# Patient Record
Sex: FEMALE | Race: WHITE | NOT HISPANIC OR LATINO | Employment: OTHER | URBAN - METROPOLITAN AREA
[De-identification: names, ages, dates, MRNs, and addresses within clinical notes are randomized per-mention and may not be internally consistent; named-entity substitution may affect disease eponyms.]

---

## 2017-05-10 ENCOUNTER — OFFICE VISIT (OUTPATIENT)
Dept: OBSTETRICS AND GYNECOLOGY | Facility: CLINIC | Age: 62
End: 2017-05-10
Payer: COMMERCIAL

## 2017-05-10 VITALS — HEIGHT: 60 IN | DIASTOLIC BLOOD PRESSURE: 72 MMHG | SYSTOLIC BLOOD PRESSURE: 130 MMHG

## 2017-05-10 DIAGNOSIS — N60.21 LUMPY BREASTS, RIGHT: Primary | ICD-10-CM

## 2017-05-10 DIAGNOSIS — N64.4 PAIN OF RIGHT BREAST: ICD-10-CM

## 2017-05-10 PROCEDURE — 99213 OFFICE O/P EST LOW 20 MIN: CPT | Mod: S$GLB,,, | Performed by: OBSTETRICS & GYNECOLOGY

## 2017-05-10 PROCEDURE — 1160F RVW MEDS BY RX/DR IN RCRD: CPT | Mod: S$GLB,,, | Performed by: OBSTETRICS & GYNECOLOGY

## 2017-05-10 PROCEDURE — 99999 PR PBB SHADOW E&M-NEW PATIENT-LVL II: CPT | Mod: PBBFAC,,, | Performed by: OBSTETRICS & GYNECOLOGY

## 2017-05-10 RX ORDER — OXYBUTYNIN CHLORIDE 10 MG/1
10 TABLET, EXTENDED RELEASE ORAL
COMMUNITY
Start: 2015-05-20

## 2017-05-10 RX ORDER — HYDROCHLOROTHIAZIDE 25 MG/1
25 TABLET ORAL
Status: ON HOLD | COMMUNITY
Start: 2015-05-20 | End: 2018-07-26 | Stop reason: HOSPADM

## 2017-05-10 RX ORDER — METOPROLOL SUCCINATE 50 MG/1
50 TABLET, EXTENDED RELEASE ORAL
Status: ON HOLD | COMMUNITY
Start: 2015-05-20 | End: 2018-07-23

## 2017-05-10 RX ORDER — ENALAPRIL MALEATE 5 MG/1
5 TABLET ORAL
Status: ON HOLD | COMMUNITY
Start: 2015-05-20 | End: 2018-07-26 | Stop reason: HOSPADM

## 2017-05-10 RX ORDER — TOBRAMYCIN 3 MG/ML
1 SOLUTION/ DROPS OPHTHALMIC
Status: ON HOLD | COMMUNITY
End: 2018-07-26 | Stop reason: HOSPADM

## 2017-05-10 RX ORDER — WARFARIN SODIUM 5 MG/1
5 TABLET ORAL
COMMUNITY
Start: 2015-05-20

## 2017-05-10 RX ORDER — CLOTRIMAZOLE 10 MG/1
10 LOZENGE ORAL; TOPICAL
COMMUNITY
Start: 2015-05-20

## 2017-05-10 RX ORDER — INSULIN GLARGINE 100 [IU]/ML
INJECTION, SOLUTION SUBCUTANEOUS
COMMUNITY
Start: 2015-08-21

## 2017-05-10 RX ORDER — NYSTATIN AND TRIAMCINOLONE ACETONIDE 100000; 1 [USP'U]/G; MG/G
CREAM TOPICAL
Status: ON HOLD | COMMUNITY
Start: 2015-05-20 | End: 2018-07-26 | Stop reason: HOSPADM

## 2017-05-10 RX ORDER — AMLODIPINE BESYLATE 5 MG/1
5 TABLET ORAL
COMMUNITY
Start: 2015-05-25

## 2017-05-10 RX ORDER — METFORMIN HYDROCHLORIDE 1000 MG/1
1000 TABLET ORAL
Status: ON HOLD | COMMUNITY
Start: 2015-05-20 | End: 2018-07-26 | Stop reason: HOSPADM

## 2017-05-10 NOTE — MR AVS SNAPSHOT
[facility]  [address] [facility]  [place] [address]  Phone: [phone]  Fax: [phone]                  Daiana Bear   5/10/2017 11:15 AM   Office Visit    Description:  Female : 1955   Provider:  Jenny Tomlinson MD   Department:  [facility]           Reason for Visit     Breast Mass           Diagnoses this Visit        Comments    Lumpy breasts, left    -  Primary            To Do List           Future Appointments        Provider Department Dept Phone    2017 2:30 PM [facility] [id number] [facility] [phone]      Goals (5 Years of Data)     None      [facility]     [facility] Nurse Care Line -  Assistance  Unless otherwise directed by your provider, please contact [facility], our nurse care line that is available for  assistance.     Registered nurses in the [facility] provide: appointment scheduling, clinical advisement, health education, and other advisory services.  Call: [phone] (toll free)               Medications                Verify that the below list of medications is an accurate representation of the medications you are currently taking.  If none reported, the list may be blank. If incorrect, please contact your healthcare provider. Carry this list with you in case of emergency.           Current Medications     amlodipine (NORVASC) 5 MG tablet Take 5 mg by mouth.    clotrimazole (MYCELEX) 10 mg sruthi Take 10 mg by mouth.    enalapril (VASOTEC) 5 MG tablet Take 5 mg by mouth.    hydrochlorothiazide (HYDRODIURIL) 25 MG tablet Take 25 mg by mouth.    insulin glargine (LANTUS SOLOSTAR) 100 unit/mL (3 mL) InPn pen INJECT 58 UNITS INTO SKIN EVERY EVENING    metformin (GLUCOPHAGE) 1000 MG tablet Take 1,000 mg by mouth.    metoprolol succinate (TOPROL-XL) 50 MG 24 hr tablet Take 50 mg by mouth.    nystatin-triamcinolone (MYCOLOG II) cream Apply topically.    oxybutynin (DITROPAN-XL) 10 MG 24 hr tablet  Take 10 mg by mouth.    tobramycin sulfate 0.3% (TOBREX) 0.3 % ophthalmic solution 1 drop.    warfarin (COUMADIN) 5 MG tablet Take 5 mg by mouth.           Clinical Reference Information           Your Vitals Were     BP Height Last Period             130/72 5' (1.524 m) (Approximate)         Blood Pressure          Most Recent Value    BP  130/72      Allergies as of 5/10/2017     Ciprofloxacin    Fluconazole    Prednisone    Rivaroxaban      Immunizations Administered on Date of Encounter - 5/10/2017     None      Orders Placed During Today's Visit     Future Labs/Procedures Expected by Expires    Mammo Digital Diagnostic Bilat with CAD  5/10/2017 7/10/2018      [facility] Sign-Up     Activating your [facility] account is as easy as 1-2-3!     1) Visit [email or web address], select Sign Up Now, enter this activation code and your date of birth, then select Next.  [email or web address]  Expires: 6/24/2017 12:24 PM      2) Create a username and password to use when you visit [facility] in the future and select a security question in case you lose your password and select Next.    3) Enter your e-mail address and click Sign Up!    Additional Information  If you have questions, please e-mail [email or web address] or call [phone] to talk to our [facility] staff. Remember, [facility] is NOT to be used for urgent needs. For medical emergencies, dial 911.         Language Assistance Services     ATTENTION: Language assistance services are available, free of charge. Please call [phone].      ATENCIÓN: Si habla español, tiene a de souza disposición servicios gratuitos de asistencia lingüística. Llame al [phone].     CHÚ Ý: N?u b?n nói Ti?ng Vi?t, có các d?ch v? h? tr? ngôn ng? mi?n phí dành cho b?n. G?i s? [phone].         [facility] complies with applicable Federal civil rights laws and does not discriminate on the basis of race, color, national origin, age, disability, or sex.

## 2017-05-10 NOTE — PROGRESS NOTES
"Subjective:       Patient ID: Daiana Bear is a 61 y.o. female.    Chief Complaint:  Breast Mass      History of Present Illness  HPI  here for problem   C/o breast lump/pain  Noticed when starting doing upper body strengthening in preparation for lower leg prosthesis  Feels "lump" is smaller and not noticing as much pain  Feels had mammo at least in , thinks had done in 2016--but not sure    GYN & OB History  No LMP recorded (approximate). Patient is postmenopausal.   Date of Last Pap: No result found    OB History    Para Term  AB SAB TAB Ectopic Multiple Living   1    1 1          # Outcome Date GA Lbr Joe/2nd Weight Sex Delivery Anes PTL Lv   1 SAB                   Review of Systems  Review of Systems   Constitutional: Negative for activity change, appetite change, chills, diaphoresis, fatigue, fever and unexpected weight change.   HENT: Negative for mouth sores and tinnitus.    Eyes: Negative for discharge and visual disturbance.   Respiratory: Negative for cough, shortness of breath and wheezing.    Cardiovascular: Negative for chest pain, palpitations and leg swelling.   Gastrointestinal: Negative for abdominal pain, bloating, blood in stool, constipation, diarrhea, nausea and vomiting.   Endocrine: Negative for diabetes, hair loss, hot flashes, hyperthyroidism and hypothyroidism.   Genitourinary: Negative for decreased libido, dyspareunia, dysuria, flank pain, frequency, genital sores, hematuria, menorrhagia, menstrual problem, pelvic pain, urgency, vaginal bleeding, vaginal discharge, vaginal pain, dysmenorrhea, urinary incontinence, postcoital bleeding, postmenopausal bleeding and vaginal odor.   Musculoskeletal: Negative for back pain and myalgias.   Skin:  Negative for rash, no acne and hair changes.   Neurological: Negative for seizures, syncope, numbness and headaches.   Hematological: Negative for adenopathy. Does not bruise/bleed easily.   Psychiatric/Behavioral: Negative for " depression and sleep disturbance. The patient is not nervous/anxious.    Breast: Positive for breast mass and breast pain.Negative for nipple discharge and skin changes          Objective:    Physical Exam:        Pulmonary/Chest: Right breast exhibits tenderness. Right breast exhibits no mass, no skin change, presence and no swelling. Left breast exhibits no mass, no nipple discharge, no skin change, no tenderness, presence and no swelling. Breasts are symmetrical.                                   Assessment:         Encounter Diagnoses   Name Primary?    Lumpy breasts, right Yes    Pain of right breast                Plan:   Will get dx mammo  Suspect fibrocystic breast  F/u based on dx mammo findings

## 2017-05-16 ENCOUNTER — HOSPITAL ENCOUNTER (OUTPATIENT)
Dept: RADIOLOGY | Facility: HOSPITAL | Age: 62
Discharge: HOME | End: 2017-05-16
Attending: OBSTETRICS & GYNECOLOGY
Payer: COMMERCIAL

## 2017-05-16 DIAGNOSIS — N60.21 LUMPY BREASTS, RIGHT: ICD-10-CM

## 2017-05-16 PROCEDURE — 77062 BREAST TOMOSYNTHESIS BI: CPT | Mod: TC

## 2017-05-16 PROCEDURE — 76642 ULTRASOUND BREAST LIMITED: CPT | Mod: 26,RT,, | Performed by: RADIOLOGY

## 2017-05-16 PROCEDURE — 77066 DX MAMMO INCL CAD BI: CPT | Mod: 26,,, | Performed by: RADIOLOGY

## 2017-05-16 PROCEDURE — 76642 ULTRASOUND BREAST LIMITED: CPT | Mod: TC,PO,RT

## 2017-05-16 PROCEDURE — 77062 BREAST TOMOSYNTHESIS BI: CPT | Mod: 26,,, | Performed by: RADIOLOGY

## 2017-05-23 ENCOUNTER — TELEPHONE (OUTPATIENT)
Dept: RADIOLOGY | Facility: HOSPITAL | Age: 62
End: 2017-05-23

## 2017-05-23 NOTE — TELEPHONE ENCOUNTER
Breast Care Management Follow-Up:    Date of Mammogram:05/16/17    Mammogram Reason:Lump or thickening in the right breast    Mammogram Results:and Right U/S - no abnormality seen.      Referrals/Recommendations:Annual mammo. Any decision to biopsy should be made on a clinical basis.        Patient Status:05/23/17 Results and rec given to pt. Results letter and report mailed to pt. Pt denies feeling any lump at this time. Encouraged her to contact Dr. Tomlinson with any changes. She agreed.

## 2018-07-22 ENCOUNTER — HOSPITAL ENCOUNTER (OUTPATIENT)
Facility: HOSPITAL | Age: 63
Discharge: NURSING, REHAB OR LONG-TERM CARE | End: 2018-07-26
Attending: INTERNAL MEDICINE | Admitting: HOSPITALIST
Payer: MEDICARE

## 2018-07-22 DIAGNOSIS — B99.9 INFECTION: ICD-10-CM

## 2018-07-22 DIAGNOSIS — N39.0 UTI (URINARY TRACT INFECTION): ICD-10-CM

## 2018-07-22 DIAGNOSIS — R73.9 HYPERGLYCEMIA: ICD-10-CM

## 2018-07-22 DIAGNOSIS — R62.7 FTT (FAILURE TO THRIVE) IN ADULT: ICD-10-CM

## 2018-07-22 DIAGNOSIS — R79.89 ELEVATED TROPONIN: ICD-10-CM

## 2018-07-22 DIAGNOSIS — I10 ESSENTIAL HYPERTENSION: Primary | ICD-10-CM

## 2018-07-22 DIAGNOSIS — E83.59 CALCIPHYLAXIS: ICD-10-CM

## 2018-07-22 DIAGNOSIS — R07.9 CHEST PAIN: ICD-10-CM

## 2018-07-22 DIAGNOSIS — M79.3 ACUTE PANNICULITIS: ICD-10-CM

## 2018-07-22 PROBLEM — R53.81 DEBILITY: Status: ACTIVE | Noted: 2018-07-22

## 2018-07-22 PROBLEM — M54.50 CHRONIC LOWER BACK PAIN: Status: ACTIVE | Noted: 2018-07-22

## 2018-07-22 PROBLEM — I25.10 CAD IN NATIVE ARTERY: Status: ACTIVE | Noted: 2018-07-22

## 2018-07-22 PROBLEM — Z86.718 HISTORY OF DVT (DEEP VEIN THROMBOSIS): Status: ACTIVE | Noted: 2018-07-22

## 2018-07-22 PROBLEM — I25.10 CAD IN NATIVE ARTERY: Chronic | Status: ACTIVE | Noted: 2018-07-22

## 2018-07-22 PROBLEM — I25.2 HISTORY OF MI (MYOCARDIAL INFARCTION): Status: ACTIVE | Noted: 2018-07-22

## 2018-07-22 PROBLEM — K76.0 FATTY LIVER DISEASE, NONALCOHOLIC: Status: ACTIVE | Noted: 2018-07-22

## 2018-07-22 PROBLEM — N32.81 OVERACTIVE BLADDER: Status: ACTIVE | Noted: 2018-07-22

## 2018-07-22 PROBLEM — G89.29 CHRONIC LOWER BACK PAIN: Status: ACTIVE | Noted: 2018-07-22

## 2018-07-22 PROBLEM — H91.90 HEARING LOSS: Status: ACTIVE | Noted: 2018-07-22

## 2018-07-22 PROBLEM — S22.080A COMPRESSION FRACTURE OF T12 VERTEBRA: Status: ACTIVE | Noted: 2018-07-22

## 2018-07-22 PROBLEM — M17.0 OSTEOARTHRITIS OF BOTH KNEES: Status: ACTIVE | Noted: 2018-07-22

## 2018-07-22 PROBLEM — K57.30 SIGMOID DIVERTICULOSIS: Status: ACTIVE | Noted: 2018-07-22

## 2018-07-22 PROBLEM — Z86.718 HISTORY OF DVT (DEEP VEIN THROMBOSIS): Chronic | Status: ACTIVE | Noted: 2018-07-22

## 2018-07-22 PROBLEM — E78.5 HYPERLIPIDEMIA: Status: ACTIVE | Noted: 2018-07-22

## 2018-07-22 LAB
ALBUMIN SERPL BCP-MCNC: 3.3 G/DL
ALP SERPL-CCNC: 87 U/L
ALT SERPL W/O P-5'-P-CCNC: 22 U/L
ANION GAP SERPL CALC-SCNC: 11 MMOL/L
AST SERPL-CCNC: 19 U/L
BACTERIA #/AREA URNS HPF: ABNORMAL /HPF
BASOPHILS # BLD AUTO: 0.03 K/UL
BASOPHILS NFR BLD: 0.3 %
BILIRUB SERPL-MCNC: 0.4 MG/DL
BILIRUB UR QL STRIP: NEGATIVE
BNP SERPL-MCNC: 331 PG/ML
BUN SERPL-MCNC: 12 MG/DL
CALCIUM SERPL-MCNC: 9.3 MG/DL
CHLORIDE SERPL-SCNC: 100 MMOL/L
CK MB SERPL-MCNC: 1.3 NG/ML
CK MB SERPL-RTO: 1.5 %
CK SERPL-CCNC: 85 U/L
CLARITY UR: ABNORMAL
CO2 SERPL-SCNC: 26 MMOL/L
COLOR UR: YELLOW
CREAT SERPL-MCNC: 1.1 MG/DL
DIFFERENTIAL METHOD: NORMAL
EOSINOPHIL # BLD AUTO: 0.2 K/UL
EOSINOPHIL NFR BLD: 1.9 %
ERYTHROCYTE [DISTWIDTH] IN BLOOD BY AUTOMATED COUNT: 13.6 %
EST. GFR  (AFRICAN AMERICAN): >60 ML/MIN/1.73 M^2
EST. GFR  (NON AFRICAN AMERICAN): 53 ML/MIN/1.73 M^2
GLUCOSE SERPL-MCNC: 329 MG/DL
GLUCOSE UR QL STRIP: ABNORMAL
HCT VFR BLD AUTO: 42.2 %
HGB BLD-MCNC: 13.9 G/DL
HGB UR QL STRIP: ABNORMAL
HYALINE CASTS #/AREA URNS LPF: 0 /LPF
INR PPP: 2
KETONES UR QL STRIP: ABNORMAL
LACTATE SERPL-SCNC: 1.3 MMOL/L
LACTATE SERPL-SCNC: 2 MMOL/L
LEUKOCYTE ESTERASE UR QL STRIP: ABNORMAL
LYMPHOCYTES # BLD AUTO: 2.5 K/UL
LYMPHOCYTES NFR BLD: 25.8 %
MAGNESIUM SERPL-MCNC: 1.8 MG/DL
MCH RBC QN AUTO: 28.1 PG
MCHC RBC AUTO-ENTMCNC: 32.9 G/DL
MCV RBC AUTO: 85 FL
MICROSCOPIC COMMENT: ABNORMAL
MONOCYTES # BLD AUTO: 0.6 K/UL
MONOCYTES NFR BLD: 5.8 %
NEUTROPHILS # BLD AUTO: 6.3 K/UL
NEUTROPHILS NFR BLD: 66.2 %
NITRITE UR QL STRIP: POSITIVE
PH UR STRIP: 6 [PH] (ref 5–8)
PLATELET # BLD AUTO: 230 K/UL
PMV BLD AUTO: 11 FL
POCT GLUCOSE: 296 MG/DL (ref 70–110)
POCT GLUCOSE: 299 MG/DL (ref 70–110)
POTASSIUM SERPL-SCNC: 4.2 MMOL/L
PROCALCITONIN SERPL IA-MCNC: 0.19 NG/ML
PROT SERPL-MCNC: 7.1 G/DL
PROT UR QL STRIP: ABNORMAL
PROTHROMBIN TIME: 20.6 SEC
PTH-INTACT SERPL-MCNC: 71.6 PG/ML
RBC # BLD AUTO: 4.94 M/UL
RBC #/AREA URNS HPF: 20 /HPF (ref 0–4)
SODIUM SERPL-SCNC: 137 MMOL/L
SP GR UR STRIP: 1.02 (ref 1–1.03)
TROPONIN I SERPL DL<=0.01 NG/ML-MCNC: 0.05 NG/ML
TSH SERPL DL<=0.005 MIU/L-ACNC: 1.79 UIU/ML
URN SPEC COLLECT METH UR: ABNORMAL
UROBILINOGEN UR STRIP-ACNC: NEGATIVE EU/DL
WBC # BLD AUTO: 9.51 K/UL
WBC #/AREA URNS HPF: >100 /HPF (ref 0–5)
YEAST URNS QL MICRO: ABNORMAL

## 2018-07-22 PROCEDURE — 83970 ASSAY OF PARATHORMONE: CPT

## 2018-07-22 PROCEDURE — 87186 SC STD MICRODIL/AGAR DIL: CPT

## 2018-07-22 PROCEDURE — 93010 ELECTROCARDIOGRAM REPORT: CPT | Mod: ,,, | Performed by: INTERNAL MEDICINE

## 2018-07-22 PROCEDURE — 84443 ASSAY THYROID STIM HORMONE: CPT

## 2018-07-22 PROCEDURE — 84484 ASSAY OF TROPONIN QUANT: CPT

## 2018-07-22 PROCEDURE — G0378 HOSPITAL OBSERVATION PER HR: HCPCS

## 2018-07-22 PROCEDURE — 87088 URINE BACTERIA CULTURE: CPT

## 2018-07-22 PROCEDURE — 82553 CREATINE MB FRACTION: CPT

## 2018-07-22 PROCEDURE — 25000003 PHARM REV CODE 250: Performed by: INTERNAL MEDICINE

## 2018-07-22 PROCEDURE — 85025 COMPLETE CBC W/AUTO DIFF WBC: CPT

## 2018-07-22 PROCEDURE — 63600175 PHARM REV CODE 636 W HCPCS: Performed by: INTERNAL MEDICINE

## 2018-07-22 PROCEDURE — 87086 URINE CULTURE/COLONY COUNT: CPT

## 2018-07-22 PROCEDURE — 36415 COLL VENOUS BLD VENIPUNCTURE: CPT

## 2018-07-22 PROCEDURE — 84145 PROCALCITONIN (PCT): CPT

## 2018-07-22 PROCEDURE — 80053 COMPREHEN METABOLIC PANEL: CPT

## 2018-07-22 PROCEDURE — 87077 CULTURE AEROBIC IDENTIFY: CPT

## 2018-07-22 PROCEDURE — 83605 ASSAY OF LACTIC ACID: CPT

## 2018-07-22 PROCEDURE — 86160 COMPLEMENT ANTIGEN: CPT

## 2018-07-22 PROCEDURE — 83735 ASSAY OF MAGNESIUM: CPT

## 2018-07-22 PROCEDURE — 96372 THER/PROPH/DIAG INJ SC/IM: CPT

## 2018-07-22 PROCEDURE — 63600175 PHARM REV CODE 636 W HCPCS: Performed by: NURSE PRACTITIONER

## 2018-07-22 PROCEDURE — 86160 COMPLEMENT ANTIGEN: CPT | Mod: 59

## 2018-07-22 PROCEDURE — 83880 ASSAY OF NATRIURETIC PEPTIDE: CPT

## 2018-07-22 PROCEDURE — 99285 EMERGENCY DEPT VISIT HI MDM: CPT | Mod: 25

## 2018-07-22 PROCEDURE — 36569 INSJ PICC 5 YR+ W/O IMAGING: CPT

## 2018-07-22 PROCEDURE — 82962 GLUCOSE BLOOD TEST: CPT

## 2018-07-22 PROCEDURE — 36000 PLACE NEEDLE IN VEIN: CPT

## 2018-07-22 PROCEDURE — 21400001 HC TELEMETRY ROOM

## 2018-07-22 PROCEDURE — C1751 CATH, INF, PER/CENT/MIDLINE: HCPCS

## 2018-07-22 PROCEDURE — 87040 BLOOD CULTURE FOR BACTERIA: CPT | Mod: 59

## 2018-07-22 PROCEDURE — 85610 PROTHROMBIN TIME: CPT

## 2018-07-22 PROCEDURE — 83605 ASSAY OF LACTIC ACID: CPT | Mod: 91

## 2018-07-22 PROCEDURE — 82550 ASSAY OF CK (CPK): CPT

## 2018-07-22 PROCEDURE — 11000001 HC ACUTE MED/SURG PRIVATE ROOM

## 2018-07-22 PROCEDURE — 25000003 PHARM REV CODE 250: Performed by: NURSE PRACTITIONER

## 2018-07-22 PROCEDURE — 81000 URINALYSIS NONAUTO W/SCOPE: CPT

## 2018-07-22 RX ORDER — SODIUM CHLORIDE, SODIUM LACTATE, POTASSIUM CHLORIDE, CALCIUM CHLORIDE 600; 310; 30; 20 MG/100ML; MG/100ML; MG/100ML; MG/100ML
INJECTION, SOLUTION INTRAVENOUS CONTINUOUS
Status: DISCONTINUED | OUTPATIENT
Start: 2018-07-22 | End: 2018-07-23

## 2018-07-22 RX ORDER — ASPIRIN 81 MG/1
81 TABLET ORAL DAILY
Status: DISCONTINUED | OUTPATIENT
Start: 2018-07-23 | End: 2018-07-26 | Stop reason: HOSPADM

## 2018-07-22 RX ORDER — ASPIRIN 325 MG
325 TABLET, DELAYED RELEASE (ENTERIC COATED) ORAL
Status: COMPLETED | OUTPATIENT
Start: 2018-07-22 | End: 2018-07-22

## 2018-07-22 RX ORDER — INSULIN ASPART 100 [IU]/ML
0-5 INJECTION, SOLUTION INTRAVENOUS; SUBCUTANEOUS
Status: DISCONTINUED | OUTPATIENT
Start: 2018-07-22 | End: 2018-07-26 | Stop reason: HOSPADM

## 2018-07-22 RX ORDER — ONDANSETRON 2 MG/ML
4 INJECTION INTRAMUSCULAR; INTRAVENOUS EVERY 6 HOURS PRN
Status: DISCONTINUED | OUTPATIENT
Start: 2018-07-22 | End: 2018-07-26 | Stop reason: HOSPADM

## 2018-07-22 RX ORDER — OXYBUTYNIN CHLORIDE 5 MG/1
10 TABLET, EXTENDED RELEASE ORAL DAILY
Status: DISCONTINUED | OUTPATIENT
Start: 2018-07-23 | End: 2018-07-26 | Stop reason: HOSPADM

## 2018-07-22 RX ORDER — PANTOPRAZOLE SODIUM 40 MG/1
40 TABLET, DELAYED RELEASE ORAL DAILY
Status: DISCONTINUED | OUTPATIENT
Start: 2018-07-23 | End: 2018-07-26 | Stop reason: HOSPADM

## 2018-07-22 RX ORDER — METOPROLOL SUCCINATE 50 MG/1
50 TABLET, EXTENDED RELEASE ORAL DAILY
Status: DISCONTINUED | OUTPATIENT
Start: 2018-07-23 | End: 2018-07-23

## 2018-07-22 RX ORDER — ACETAMINOPHEN 325 MG/1
650 TABLET ORAL EVERY 6 HOURS PRN
Status: DISCONTINUED | OUTPATIENT
Start: 2018-07-22 | End: 2018-07-26 | Stop reason: HOSPADM

## 2018-07-22 RX ORDER — VANCOMYCIN HCL IN 5 % DEXTROSE 1G/250ML
15 PLASTIC BAG, INJECTION (ML) INTRAVENOUS ONCE
Status: COMPLETED | OUTPATIENT
Start: 2018-07-22 | End: 2018-07-22

## 2018-07-22 RX ORDER — VANCOMYCIN HCL IN 5 % DEXTROSE 1G/250ML
1000 PLASTIC BAG, INJECTION (ML) INTRAVENOUS
Status: DISCONTINUED | OUTPATIENT
Start: 2018-07-23 | End: 2018-07-25

## 2018-07-22 RX ORDER — HYDROCODONE BITARTRATE AND ACETAMINOPHEN 5; 325 MG/1; MG/1
1 TABLET ORAL EVERY 4 HOURS PRN
Status: DISCONTINUED | OUTPATIENT
Start: 2018-07-22 | End: 2018-07-26 | Stop reason: HOSPADM

## 2018-07-22 RX ORDER — ENOXAPARIN SODIUM 100 MG/ML
40 INJECTION SUBCUTANEOUS EVERY 24 HOURS
Status: DISCONTINUED | OUTPATIENT
Start: 2018-07-22 | End: 2018-07-22

## 2018-07-22 RX ORDER — ATORVASTATIN CALCIUM 40 MG/1
40 TABLET, FILM COATED ORAL NIGHTLY
Status: DISCONTINUED | OUTPATIENT
Start: 2018-07-22 | End: 2018-07-26 | Stop reason: HOSPADM

## 2018-07-22 RX ORDER — IBUPROFEN 200 MG
16 TABLET ORAL
Status: DISCONTINUED | OUTPATIENT
Start: 2018-07-22 | End: 2018-07-26 | Stop reason: HOSPADM

## 2018-07-22 RX ORDER — IBUPROFEN 200 MG
24 TABLET ORAL
Status: DISCONTINUED | OUTPATIENT
Start: 2018-07-22 | End: 2018-07-26 | Stop reason: HOSPADM

## 2018-07-22 RX ORDER — HYDROMORPHONE HYDROCHLORIDE 1 MG/ML
1 INJECTION, SOLUTION INTRAMUSCULAR; INTRAVENOUS; SUBCUTANEOUS EVERY 4 HOURS PRN
Status: DISCONTINUED | OUTPATIENT
Start: 2018-07-22 | End: 2018-07-23

## 2018-07-22 RX ORDER — GLUCAGON 1 MG
1 KIT INJECTION
Status: DISCONTINUED | OUTPATIENT
Start: 2018-07-22 | End: 2018-07-26 | Stop reason: HOSPADM

## 2018-07-22 RX ORDER — ENALAPRIL MALEATE 5 MG/1
5 TABLET ORAL DAILY
Status: DISCONTINUED | OUTPATIENT
Start: 2018-07-23 | End: 2018-07-25

## 2018-07-22 RX ORDER — WARFARIN SODIUM 5 MG/1
5 TABLET ORAL DAILY
Status: DISCONTINUED | OUTPATIENT
Start: 2018-07-23 | End: 2018-07-26 | Stop reason: HOSPADM

## 2018-07-22 RX ORDER — RAMELTEON 8 MG/1
8 TABLET ORAL NIGHTLY PRN
Status: DISCONTINUED | OUTPATIENT
Start: 2018-07-22 | End: 2018-07-26 | Stop reason: HOSPADM

## 2018-07-22 RX ORDER — AMLODIPINE BESYLATE 5 MG/1
5 TABLET ORAL DAILY
Status: DISCONTINUED | OUTPATIENT
Start: 2018-07-23 | End: 2018-07-26 | Stop reason: HOSPADM

## 2018-07-22 RX ADMIN — ENOXAPARIN SODIUM 40 MG: 100 INJECTION SUBCUTANEOUS at 10:07

## 2018-07-22 RX ADMIN — ATORVASTATIN CALCIUM 40 MG: 40 TABLET, FILM COATED ORAL at 10:07

## 2018-07-22 RX ADMIN — HYDROMORPHONE HYDROCHLORIDE 1 MG: 1 INJECTION, SOLUTION INTRAMUSCULAR; INTRAVENOUS; SUBCUTANEOUS at 10:07

## 2018-07-22 RX ADMIN — SODIUM CHLORIDE, SODIUM LACTATE, POTASSIUM CHLORIDE, AND CALCIUM CHLORIDE: .6; .31; .03; .02 INJECTION, SOLUTION INTRAVENOUS at 10:07

## 2018-07-22 RX ADMIN — ASPIRIN 325 MG: 325 TABLET, DELAYED RELEASE ORAL at 07:07

## 2018-07-22 RX ADMIN — INSULIN ASPART 1 UNITS: 100 INJECTION, SOLUTION INTRAVENOUS; SUBCUTANEOUS at 10:07

## 2018-07-22 RX ADMIN — INSULIN DETEMIR 58 UNITS: 100 INJECTION, SOLUTION SUBCUTANEOUS at 10:07

## 2018-07-22 RX ADMIN — VANCOMYCIN HYDROCHLORIDE 1000 MG: 1 INJECTION, POWDER, LYOPHILIZED, FOR SOLUTION INTRAVENOUS at 09:07

## 2018-07-22 NOTE — ED PROVIDER NOTES
"SCRIBE #1 NOTE: I, Mojgan Ji, am scribing for, and in the presence of, Tim Jaeger MD. I have scribed the entire note.      History      Chief Complaint   Patient presents with    Weakness       Review of patient's allergies indicates:   Allergen Reactions    Ciprofloxacin Hives     Allergic to Cipro    Fluconazole Hives    Prednisone Rash     Rash after taking steroids    Rivaroxaban Rash        HPI   HPI    7/22/2018, 5:34 PM   History obtained from the patient and EMS      History of Present Illness: Daiana Bear is a 62 y.o. female patient w/ a PMHx of uncontrolled type 2 diabetes and HTN presents to the Emergency Department for pain to pannus area which onset gradually a few days ago. Pt also c/o generalized weakness and a blister to her lower abdomen. Per EMS, "pt was hypertensive en route w/ her BP being around 180/80." Pt has been unable to move or take care of herself. Pt has not taken any of her HTN or diabetes medications, and has not eaten anything in 2 days. Pt's brother moved in with pt and does not know how to prepare food for her or properly care for her. Symptoms are constant and moderate in severity. No mitigating or exacerbating factors reported. Patient denies any fall, injury, arthralgias, myalgias, pelvic pain, nausea, vomiting, abdominal pain, fever, chills, headache, and all other sxs at this time. Prior Tx includes 2 sessions of acupuncture, w/ no relief. Pt is on coumadin. No further complaints or concerns at this time.         Arrival mode: EMS     PCP: Suzanne Henson DO       Past Medical History:  Past Medical History:   Diagnosis Date    Anticoagulant long-term use     Arthritis     Chronic back pain     Coronary artery disease     Diabetes mellitus     History of DVT of lower extremity     Acute post-op DVT x 2    Hyperlipidemia     Hypertension     Overactive bladder     Peripheral neuropathy     PVD (peripheral vascular disease)     Thyroid disease  "       Past Surgical History:  Past Surgical History:   Procedure Laterality Date    CORONARY ANGIOPLASTY WITH STENT PLACEMENT  2006    GASTRIC BYPASS      HYSTERECTOMY      LEG AMPUTATION THROUGH KNEE Left          Family History:  Family History   Problem Relation Age of Onset    Hypertension Father     Hypertension Mother     Diabetes Mother     Diabetes Sister        Social History:  Social History     Social History Main Topics    Smoking status: Never Smoker    Smokeless tobacco: Unknown    Alcohol use No    Drug use: No    Sexual activity: Not Currently     Partners: Male       ROS   Review of Systems   Constitutional: Negative for chills, diaphoresis, fatigue and fever.        (-) fall  (-) injury   HENT: Negative for congestion and sore throat.    Respiratory: Negative for cough and shortness of breath.    Cardiovascular: Negative for chest pain.   Gastrointestinal: Negative for abdominal pain, diarrhea, nausea and vomiting.   Genitourinary: Negative for dysuria and pelvic pain.   Musculoskeletal: Negative for back pain and myalgias.   Skin: Negative for rash.        (+) blister to lower abdomen  (+) pain to pannus area   Neurological: Positive for weakness (generalized). Negative for dizziness, syncope, light-headedness, numbness and headaches.   Hematological: Does not bruise/bleed easily.       Physical Exam      Initial Vitals [07/22/18 1719]   BP Pulse Resp Temp SpO2   (!) 189/87 80 18 98.8 °F (37.1 °C) 98 %      MAP       --          Physical Exam  Nursing Notes and Vital Signs Reviewed.  Constitutional: Patient is in mild distress. Obese.  Head: Atraumatic. Normocephalic.  Eyes: PERRL. EOM intact. Conjunctivae are not pale. No scleral icterus.  ENT: Mucous membranes are moist. Oropharynx is clear and symmetric.    Neck: Supple. Full ROM. No lymphadenopathy.  Cardiovascular: Regular rate. Regular rhythm. No murmurs, rubs, or gallops. R distal pulse is 2+.  Pulmonary/Chest: No respiratory  "distress. Clear to auscultation bilaterally. No wheezing or rales.  Abdominal: Soft and non-distended.  No tenderness.  No rebound, guarding, or rigidity. Tenderness to R lower trunk, consistent w/ panniculitis. Inflamed small area to trunk w/ no fluctuance; possible calciphylaxis.  Musculoskeletal: Unable to move extremities. No edema. No calf tenderness. L above the knee amputation.   RLE: no evident deformity. R knee effusion w/ DJD. Negative for swelling. Negative for tenderness. Decreased ROM. DP and PT pulses are equal and 2+ bilaterally. No motor deficit. No distal sensory deficit  Skin: Warm and dry. Cholesterol embolus in R foot.   Neurological:  Alert, awake, and appropriate.  Normal speech.  No acute focal neurological deficits are appreciated.  Psychiatric: Normal affect. Good eye contact. Appropriate in content.    ED Course    Procedures  ED Vital Signs:  Vitals:    07/22/18 1719 07/22/18 1832 07/22/18 1847 07/22/18 1919   BP: (!) 189/87  (!) 161/79 (!) 145/87   Pulse: 80  73 80   Resp: 18  20 (!) 28   Temp: 98.8 °F (37.1 °C)      TempSrc: Oral      SpO2: 98%  96% 98%   Weight:  89.1 kg (196 lb 8 oz)     Height:  5' 3" (1.6 m)      07/22/18 2045 07/22/18 2050 07/22/18 2344 07/23/18 0047   BP:  (!) 142/86  (!) 164/88   Pulse: 83 79 84 88   Resp:  17  18   Temp:  98.8 °F (37.1 °C)  97.7 °F (36.5 °C)   TempSrc:  Oral  Oral   SpO2:  96%  95%   Weight:       Height: 5' (1.524 m)       07/23/18 0117 07/23/18 0325 07/23/18 0332 07/23/18 0518   BP:   136/64    Pulse: 81 87 87 86   Resp:   17    Temp:   98.2 °F (36.8 °C)    TempSrc:   Oral    SpO2:   (!) 94%    Weight:       Height:        07/23/18 0748   BP: 112/70   Pulse: 85   Resp: 18   Temp: 98.5 °F (36.9 °C)   TempSrc: Oral   SpO2: (!) 94%   Weight:    Height:        Abnormal Lab Results:  Labs Reviewed   COMPREHENSIVE METABOLIC PANEL - Abnormal; Notable for the following:        Result Value    Glucose 329 (*)     Albumin 3.3 (*)     eGFR if non  " American 53 (*)     All other components within normal limits   URINALYSIS - Abnormal; Notable for the following:     Appearance, UA Cloudy (*)     Protein, UA 2+ (*)     Glucose, UA 3+ (*)     Ketones, UA 1+ (*)     Occult Blood UA 2+ (*)     Nitrite, UA Positive (*)     Leukocytes, UA 1+ (*)     All other components within normal limits   TROPONIN I - Abnormal; Notable for the following:     Troponin I 0.048 (*)     All other components within normal limits   B-TYPE NATRIURETIC PEPTIDE - Abnormal; Notable for the following:      (*)     All other components within normal limits   URINALYSIS MICROSCOPIC - Abnormal; Notable for the following:     RBC, UA 20 (*)     WBC, UA >100 (*)     Bacteria, UA Many (*)     All other components within normal limits   POCT GLUCOSE - Abnormal; Notable for the following:     POCT Glucose 296 (*)     All other components within normal limits   CBC W/ AUTO DIFFERENTIAL   LACTIC ACID, PLASMA   PROCALCITONIN   PTH, INTACT   C3 COMPLEMENT   C4 COMPLEMENT   POCT GLUCOSE MONITORING CONTINUOUS        All Lab Results:  Results for orders placed or performed during the hospital encounter of 07/22/18   Blood culture x two cultures. Draw prior to antibiotics.   Result Value Ref Range    Blood Culture, Routine No Growth to date    Blood culture x two cultures. Draw prior to antibiotics.   Result Value Ref Range    Blood Culture, Routine No Growth to date    CBC auto differential   Result Value Ref Range    WBC 9.51 3.90 - 12.70 K/uL    RBC 4.94 4.00 - 5.40 M/uL    Hemoglobin 13.9 12.0 - 16.0 g/dL    Hematocrit 42.2 37.0 - 48.5 %    MCV 85 82 - 98 fL    MCH 28.1 27.0 - 31.0 pg    MCHC 32.9 32.0 - 36.0 g/dL    RDW 13.6 11.5 - 14.5 %    Platelets 230 150 - 350 K/uL    MPV 11.0 9.2 - 12.9 fL    Gran # (ANC) 6.3 1.8 - 7.7 K/uL    Lymph # 2.5 1.0 - 4.8 K/uL    Mono # 0.6 0.3 - 1.0 K/uL    Eos # 0.2 0.0 - 0.5 K/uL    Baso # 0.03 0.00 - 0.20 K/uL    Gran% 66.2 38.0 - 73.0 %    Lymph% 25.8 18.0 -  48.0 %    Mono% 5.8 4.0 - 15.0 %    Eosinophil% 1.9 0.0 - 8.0 %    Basophil% 0.3 0.0 - 1.9 %    Differential Method Automated    Comprehensive metabolic panel   Result Value Ref Range    Sodium 137 136 - 145 mmol/L    Potassium 4.2 3.5 - 5.1 mmol/L    Chloride 100 95 - 110 mmol/L    CO2 26 23 - 29 mmol/L    Glucose 329 (H) 70 - 110 mg/dL    BUN, Bld 12 8 - 23 mg/dL    Creatinine 1.1 0.5 - 1.4 mg/dL    Calcium 9.3 8.7 - 10.5 mg/dL    Total Protein 7.1 6.0 - 8.4 g/dL    Albumin 3.3 (L) 3.5 - 5.2 g/dL    Total Bilirubin 0.4 0.1 - 1.0 mg/dL    Alkaline Phosphatase 87 55 - 135 U/L    AST 19 10 - 40 U/L    ALT 22 10 - 44 U/L    Anion Gap 11 8 - 16 mmol/L    eGFR if African American >60 >60 mL/min/1.73 m^2    eGFR if non African American 53 (A) >60 mL/min/1.73 m^2   Lactic acid, plasma #1   Result Value Ref Range    Lactate (Lactic Acid) 2.0 0.5 - 2.2 mmol/L   Lactic acid, plasma #2   Result Value Ref Range    Lactate (Lactic Acid) 1.3 0.5 - 2.2 mmol/L   Urinalysis   Result Value Ref Range    Specimen UA Urine, Catheterized     Color, UA Yellow Yellow, Straw, Meera    Appearance, UA Cloudy (A) Clear    pH, UA 6.0 5.0 - 8.0    Specific Gravity, UA 1.025 1.005 - 1.030    Protein, UA 2+ (A) Negative    Glucose, UA 3+ (A) Negative    Ketones, UA 1+ (A) Negative    Bilirubin (UA) Negative Negative    Occult Blood UA 2+ (A) Negative    Nitrite, UA Positive (A) Negative    Urobilinogen, UA Negative <2.0 EU/dL    Leukocytes, UA 1+ (A) Negative   Troponin I   Result Value Ref Range    Troponin I 0.048 (H) 0.000 - 0.026 ng/mL   Procalcitonin   Result Value Ref Range    Procalcitonin 0.19 <0.25 ng/mL   Brain natriuretic peptide   Result Value Ref Range     (H) 0 - 99 pg/mL   PTH, intact   Result Value Ref Range    PTH, Intact 71.6 9.0 - 77.0 pg/mL   Urinalysis Microscopic   Result Value Ref Range    RBC, UA 20 (H) 0 - 4 /hpf    WBC, UA >100 (H) 0 - 5 /hpf    Bacteria, UA Many (A) None-Occ /hpf    Yeast, UA None None    Hyaline  Casts, UA 0 0-1/lpf /lpf    Microscopic Comment SEE COMMENT    Protime-INR   Result Value Ref Range    Prothrombin Time 20.6 (H) 9.0 - 12.5 sec    INR 2.0 (H) 0.8 - 1.2   TSH   Result Value Ref Range    TSH 1.789 0.400 - 4.000 uIU/mL   Magnesium   Result Value Ref Range    Magnesium 1.8 1.6 - 2.6 mg/dL   CK-MB   Result Value Ref Range    CPK 85 20 - 180 U/L    CPK MB 1.3 0.1 - 6.5 ng/mL    MB% 1.5 0.0 - 5.0 %   Troponin I   Result Value Ref Range    Troponin I 0.057 (H) 0.000 - 0.026 ng/mL   CBC auto differential   Result Value Ref Range    WBC 7.92 3.90 - 12.70 K/uL    RBC 4.58 4.00 - 5.40 M/uL    Hemoglobin 12.7 12.0 - 16.0 g/dL    Hematocrit 39.1 37.0 - 48.5 %    MCV 85 82 - 98 fL    MCH 27.7 27.0 - 31.0 pg    MCHC 32.5 32.0 - 36.0 g/dL    RDW 13.7 11.5 - 14.5 %    Platelets 219 150 - 350 K/uL    MPV 11.5 9.2 - 12.9 fL    Gran # (ANC) 4.7 1.8 - 7.7 K/uL    Lymph # 2.3 1.0 - 4.8 K/uL    Mono # 0.6 0.3 - 1.0 K/uL    Eos # 0.2 0.0 - 0.5 K/uL    Baso # 0.02 0.00 - 0.20 K/uL    Gran% 59.7 38.0 - 73.0 %    Lymph% 29.5 18.0 - 48.0 %    Mono% 7.7 4.0 - 15.0 %    Eosinophil% 2.8 0.0 - 8.0 %    Basophil% 0.3 0.0 - 1.9 %    Differential Method Automated    Basic metabolic panel   Result Value Ref Range    Sodium 137 136 - 145 mmol/L    Potassium 3.8 3.5 - 5.1 mmol/L    Chloride 99 95 - 110 mmol/L    CO2 27 23 - 29 mmol/L    Glucose 331 (H) 70 - 110 mg/dL    BUN, Bld 15 8 - 23 mg/dL    Creatinine 1.2 0.5 - 1.4 mg/dL    Calcium 8.6 (L) 8.7 - 10.5 mg/dL    Anion Gap 11 8 - 16 mmol/L    eGFR if African American 55 (A) >60 mL/min/1.73 m^2    eGFR if non African American 48 (A) >60 mL/min/1.73 m^2   Troponin I   Result Value Ref Range    Troponin I 0.042 (H) 0.000 - 0.026 ng/mL   Protime-INR   Result Value Ref Range    Prothrombin Time 24.3 (H) 9.0 - 12.5 sec    INR 2.3 (H) 0.8 - 1.2   Troponin I   Result Value Ref Range    Troponin I 0.046 (H) 0.000 - 0.026 ng/mL   POCT glucose   Result Value Ref Range    POCT Glucose 296 (H) 70  - 110 mg/dL   POCT glucose   Result Value Ref Range    POCT Glucose 299 (H) 70 - 110 mg/dL   POCT glucose   Result Value Ref Range    POCT Glucose 55 (L) 70 - 110 mg/dL   POCT glucose   Result Value Ref Range    POCT Glucose 351 (H) 70 - 110 mg/dL       Imaging Results:  Imaging Results          X-Ray Chest 1 View for PICC_Central line (Final result)  Result time 07/22/18 20:32:12    Final result by Ward Brady MD (07/22/18 20:32:12)                 Impression:      Left PICC line in good position as above.      Electronically signed by: Ward Brady MD  Date:    07/22/2018  Time:    20:32             Narrative:    EXAMINATION:  XR CHEST 1 VIEW    CLINICAL HISTORY:  PICC line placement., Evaluate PICC line placement;    COMPARISON:  07/22/2018    FINDINGS:  Left PICC line has been placed and is in good position with the catheter tip projecting at the superior SVC level.  Otherwise no change.                               X-Ray Chest AP Portable (Final result)  Result time 07/22/18 18:14:05    Final result by Ward Brady MD (07/22/18 18:14:05)                 Impression:      Nonspecific cardiomediastinal silhouette enlargement.  Decreased lung volumes.  No acute infiltrate.      Electronically signed by: Ward Brady MD  Date:    07/22/2018  Time:    18:14             Narrative:    EXAMINATION:  XR CHEST AP PORTABLE    CLINICAL HISTORY:  Sepsis.  Respiratory distress., Sepsis;    COMPARISON:  None    FINDINGS:  Large patient with shallow inspiratory effort.  The cardiac silhouette is enlarged.  There is nonspecific widening of the cardiomediastinal silhouette more so on the right than left.  This may be secondary to epidural lipomatosis.    The lung volumes are decreased with elevation of the right diaphragm.                               The EKG was ordered, reviewed, and independently interpreted by the ED provider.  Interpretation time: 18:19  Rate: 74 BPM  Rhythm: normal sinus  rhythm  Interpretation: Minimum voltage criteria for LVH. Nonspecific St and T wave abnormality. No STEMI.  When compared to past EKG performed, there are now aVF.         The Emergency Provider reviewed the vital signs and test results, which are outlined above.    ED Discussion     7:06 PM: Discussed case with Karuna Stevenson NP ([facility]), who agrees with current care and management of pt and accepts admission.   Admitting Service: Hospital medicine   Admitting Physician: Dr. Bliss  Admit to: Med Tele    7:15 PM: Re-evaluated pt. I have discussed test results, shared treatment plan, and the need for admission with patient at bedside. Pt expresses understanding at this time and agree with all information. All questions answered. Pt has no further questions or concerns at this time. Pt is ready for admit.      ED Medication(s):  Medications   HYDROcodone-acetaminophen 5-325 mg per tablet 1 tablet (not administered)   cefTRIAXone (ROCEPHIN) 1 g in dextrose 5 % 50 mL IVPB (1 g Intravenous New Bag 7/23/18 0041)   aspirin EC tablet 81 mg (81 mg Oral Given 7/23/18 0832)   glucose chewable tablet 16 g (not administered)   glucose chewable tablet 24 g (not administered)   dextrose 50% injection 12.5 g (not administered)   dextrose 50% injection 25 g (not administered)   glucagon (human recombinant) injection 1 mg (not administered)   insulin aspart U-100 pen 0-5 Units (5 Units Subcutaneous Given 7/23/18 0634)   acetaminophen tablet 650 mg (not administered)   ondansetron injection 4 mg (not administered)   ramelteon tablet 8 mg (not administered)   pantoprazole EC tablet 40 mg (40 mg Oral Given 7/23/18 0831)   insulin detemir U-100 pen 58 Units (58 Units Subcutaneous Given 7/22/18 2202)   enalapril tablet 5 mg (5 mg Oral Given 7/23/18 0831)   amLODIPine tablet 5 mg (5 mg Oral Given 7/23/18 0832)   oxybutynin 24 hr tablet 10 mg (10 mg Oral Given 7/23/18 0831)   warfarin (COUMADIN) tablet 5 mg (not administered)    atorvastatin tablet 40 mg (40 mg Oral Given 7/22/18 2202)   vancomycin in dextrose 5 % 1 gram/250 mL IVPB 1,000 mg (1,000 mg Intravenous Trough Due 30 minutes Before Dose 7/24/18 2030)   nitroGLYCERIN SL tablet 0.4 mg (not administered)   furosemide tablet 40 mg (40 mg Oral Given 7/23/18 0832)   gabapentin capsule 600 mg (600 mg Oral Given 7/23/18 0831)   levothyroxine tablet 75 mcg (75 mcg Oral Given 7/23/18 0627)   metoprolol tartrate (LOPRESSOR) tablet 25 mg (25 mg Oral Given 7/23/18 0832)   HYDROcodone-acetaminophen  mg per tablet 1 tablet (not administered)   aspirin EC tablet 325 mg (325 mg Oral Given 7/22/18 1936)   vancomycin in dextrose 5 % 1 gram/250 mL IVPB 1,000 mg (1,000 mg Intravenous New Bag 7/22/18 2135)   furosemide injection 40 mg (40 mg Intravenous Given 7/23/18 0335)       Current Discharge Medication List                Medical Decision Making    Medical Decision Making:   Clinical Tests:   Lab Tests: Ordered and Reviewed  Radiological Study: Ordered and Reviewed  Medical Tests: Ordered and Reviewed           Scribe Attestation:   Scribe #1: I performed the above scribed service and the documentation accurately describes the services I performed. I attest to the accuracy of the note.    Attending:   Physician Attestation Statement for Scribe #1: I, Tim Jaeger MD, personally performed the services described in this documentation, as scribed by Mojgan Ji, in my presence, and it is both accurate and complete.          Clinical Impression       ICD-10-CM ICD-9-CM   1. Essential hypertension I10 401.9   2. Hyperglycemia R73.9 790.29   3. Acute panniculitis M79.3 729.30   4. Infection B99.9 136.9   5. 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 Elevated troponin R74.8 790.6       Disposition:   Disposition: Admitted  Condition: Fair         Tim Jaeger MD  07/23/18 1034       Tim Jaeger MD  07/23/18 1034

## 2018-07-23 PROBLEM — L03.115 CELLULITIS OF RIGHT LOWER EXTREMITY: Status: ACTIVE | Noted: 2018-07-23

## 2018-07-23 PROBLEM — Z91.148 NONCOMPLIANCE WITH MEDICATION REGIMEN: Chronic | Status: ACTIVE | Noted: 2018-07-23

## 2018-07-23 PROBLEM — R32 INCONTINENCE ASSOCIATED DERMATITIS: Status: ACTIVE | Noted: 2018-07-23

## 2018-07-23 PROBLEM — L25.8 INCONTINENCE ASSOCIATED DERMATITIS: Status: ACTIVE | Noted: 2018-07-23

## 2018-07-23 LAB
ANION GAP SERPL CALC-SCNC: 11 MMOL/L
BASOPHILS # BLD AUTO: 0.02 K/UL
BASOPHILS NFR BLD: 0.3 %
BUN SERPL-MCNC: 15 MG/DL
C3 SERPL-MCNC: 175 MG/DL
C4 SERPL-MCNC: 33 MG/DL
CALCIUM SERPL-MCNC: 8.6 MG/DL
CHLORIDE SERPL-SCNC: 99 MMOL/L
CHOLEST SERPL-MCNC: 205 MG/DL
CHOLEST/HDLC SERPL: 7.3 {RATIO}
CO2 SERPL-SCNC: 27 MMOL/L
CREAT SERPL-MCNC: 1.2 MG/DL
DIASTOLIC DYSFUNCTION: YES
DIFFERENTIAL METHOD: NORMAL
EOSINOPHIL # BLD AUTO: 0.2 K/UL
EOSINOPHIL NFR BLD: 2.8 %
ERYTHROCYTE [DISTWIDTH] IN BLOOD BY AUTOMATED COUNT: 13.7 %
EST. GFR  (AFRICAN AMERICAN): 55 ML/MIN/1.73 M^2
EST. GFR  (NON AFRICAN AMERICAN): 48 ML/MIN/1.73 M^2
ESTIMATED AVG GLUCOSE: 298 MG/DL
ESTIMATED PA SYSTOLIC PRESSURE: 21.16
GLUCOSE SERPL-MCNC: 331 MG/DL
HBA1C MFR BLD HPLC: 12 %
HCT VFR BLD AUTO: 39.1 %
HDLC SERPL-MCNC: 28 MG/DL
HDLC SERPL: 13.7 %
HGB BLD-MCNC: 12.7 G/DL
INR PPP: 2.3
LDLC SERPL CALC-MCNC: 116.8 MG/DL
LYMPHOCYTES # BLD AUTO: 2.3 K/UL
LYMPHOCYTES NFR BLD: 29.5 %
MCH RBC QN AUTO: 27.7 PG
MCHC RBC AUTO-ENTMCNC: 32.5 G/DL
MCV RBC AUTO: 85 FL
MITRAL VALVE REGURGITATION: ABNORMAL
MONOCYTES # BLD AUTO: 0.6 K/UL
MONOCYTES NFR BLD: 7.7 %
NEUTROPHILS # BLD AUTO: 4.7 K/UL
NEUTROPHILS NFR BLD: 59.7 %
NONHDLC SERPL-MCNC: 177 MG/DL
PLATELET # BLD AUTO: 219 K/UL
PMV BLD AUTO: 11.5 FL
POCT GLUCOSE: 193 MG/DL (ref 70–110)
POCT GLUCOSE: 249 MG/DL (ref 70–110)
POCT GLUCOSE: 266 MG/DL (ref 70–110)
POCT GLUCOSE: 351 MG/DL (ref 70–110)
POCT GLUCOSE: 55 MG/DL (ref 70–110)
POTASSIUM SERPL-SCNC: 3.8 MMOL/L
PROTHROMBIN TIME: 24.3 SEC
RBC # BLD AUTO: 4.58 M/UL
RETIRED EF AND QEF - SEE NOTES: 50 (ref 55–65)
SODIUM SERPL-SCNC: 137 MMOL/L
TRIGL SERPL-MCNC: 301 MG/DL
TROPONIN I SERPL DL<=0.01 NG/ML-MCNC: 0.04 NG/ML
TROPONIN I SERPL DL<=0.01 NG/ML-MCNC: 0.05 NG/ML
TROPONIN I SERPL DL<=0.01 NG/ML-MCNC: 0.06 NG/ML
WBC # BLD AUTO: 7.92 K/UL

## 2018-07-23 PROCEDURE — 99222 1ST HOSP IP/OBS MODERATE 55: CPT | Mod: ,,, | Performed by: INTERNAL MEDICINE

## 2018-07-23 PROCEDURE — 21400001 HC TELEMETRY ROOM

## 2018-07-23 PROCEDURE — 85610 PROTHROMBIN TIME: CPT

## 2018-07-23 PROCEDURE — 80048 BASIC METABOLIC PNL TOTAL CA: CPT

## 2018-07-23 PROCEDURE — 84484 ASSAY OF TROPONIN QUANT: CPT | Mod: 91

## 2018-07-23 PROCEDURE — 84484 ASSAY OF TROPONIN QUANT: CPT

## 2018-07-23 PROCEDURE — 63600175 PHARM REV CODE 636 W HCPCS: Performed by: INTERNAL MEDICINE

## 2018-07-23 PROCEDURE — 96372 THER/PROPH/DIAG INJ SC/IM: CPT

## 2018-07-23 PROCEDURE — 25000003 PHARM REV CODE 250: Performed by: INTERNAL MEDICINE

## 2018-07-23 PROCEDURE — 97167 OT EVAL HIGH COMPLEX 60 MIN: CPT

## 2018-07-23 PROCEDURE — 83036 HEMOGLOBIN GLYCOSYLATED A1C: CPT

## 2018-07-23 PROCEDURE — 80061 LIPID PANEL: CPT

## 2018-07-23 PROCEDURE — 25000003 PHARM REV CODE 250: Performed by: NURSE PRACTITIONER

## 2018-07-23 PROCEDURE — G8981 BODY POS CURRENT STATUS: HCPCS | Mod: CM

## 2018-07-23 PROCEDURE — 93306 TTE W/DOPPLER COMPLETE: CPT | Mod: 26,,, | Performed by: INTERNAL MEDICINE

## 2018-07-23 PROCEDURE — 93010 ELECTROCARDIOGRAM REPORT: CPT | Mod: ,,, | Performed by: INTERNAL MEDICINE

## 2018-07-23 PROCEDURE — 97530 THERAPEUTIC ACTIVITIES: CPT

## 2018-07-23 PROCEDURE — 97162 PT EVAL MOD COMPLEX 30 MIN: CPT

## 2018-07-23 PROCEDURE — 63600175 PHARM REV CODE 636 W HCPCS: Performed by: NURSE PRACTITIONER

## 2018-07-23 PROCEDURE — G8982 BODY POS GOAL STATUS: HCPCS | Mod: CL

## 2018-07-23 PROCEDURE — 93306 TTE W/DOPPLER COMPLETE: CPT

## 2018-07-23 PROCEDURE — G0378 HOSPITAL OBSERVATION PER HR: HCPCS

## 2018-07-23 PROCEDURE — G8987 SELF CARE CURRENT STATUS: HCPCS | Mod: CM

## 2018-07-23 PROCEDURE — G8988 SELF CARE GOAL STATUS: HCPCS | Mod: CK

## 2018-07-23 PROCEDURE — 93005 ELECTROCARDIOGRAM TRACING: CPT

## 2018-07-23 PROCEDURE — 82652 VIT D 1 25-DIHYDROXY: CPT

## 2018-07-23 PROCEDURE — 99900031 HC PATIENT EDUCATION (STAT)

## 2018-07-23 PROCEDURE — 85025 COMPLETE CBC W/AUTO DIFF WBC: CPT

## 2018-07-23 RX ORDER — LEVOTHYROXINE SODIUM 75 UG/1
75 TABLET ORAL
COMMUNITY

## 2018-07-23 RX ORDER — SODIUM CHLORIDE 9 MG/ML
INJECTION, SOLUTION INTRAVENOUS CONTINUOUS
Status: DISCONTINUED | OUTPATIENT
Start: 2018-07-23 | End: 2018-07-23

## 2018-07-23 RX ORDER — WARFARIN 4 MG/1
4 TABLET ORAL
COMMUNITY

## 2018-07-23 RX ORDER — FUROSEMIDE 40 MG/1
40 TABLET ORAL DAILY
Status: ON HOLD | COMMUNITY
End: 2018-07-26 | Stop reason: HOSPADM

## 2018-07-23 RX ORDER — METOPROLOL TARTRATE 25 MG/1
25 TABLET, FILM COATED ORAL 2 TIMES DAILY
Status: DISCONTINUED | OUTPATIENT
Start: 2018-07-23 | End: 2018-07-26 | Stop reason: HOSPADM

## 2018-07-23 RX ORDER — GABAPENTIN 300 MG/1
600 CAPSULE ORAL 2 TIMES DAILY
Status: DISCONTINUED | OUTPATIENT
Start: 2018-07-23 | End: 2018-07-26 | Stop reason: HOSPADM

## 2018-07-23 RX ORDER — METOPROLOL TARTRATE 25 MG/1
25 TABLET, FILM COATED ORAL 2 TIMES DAILY
COMMUNITY

## 2018-07-23 RX ORDER — HYDROCHLOROTHIAZIDE 25 MG/1
25 TABLET ORAL DAILY
Status: DISCONTINUED | OUTPATIENT
Start: 2018-07-23 | End: 2018-07-23

## 2018-07-23 RX ORDER — HYDROCODONE BITARTRATE AND ACETAMINOPHEN 10; 325 MG/1; MG/1
1 TABLET ORAL EVERY 8 HOURS PRN
Status: ON HOLD | COMMUNITY
End: 2018-07-26

## 2018-07-23 RX ORDER — SOLIFENACIN SUCCINATE 10 MG/1
10 TABLET, FILM COATED ORAL DAILY
COMMUNITY

## 2018-07-23 RX ORDER — GABAPENTIN 600 MG/1
600 TABLET ORAL 2 TIMES DAILY
COMMUNITY

## 2018-07-23 RX ORDER — NITROGLYCERIN 0.4 MG/1
0.4 TABLET SUBLINGUAL EVERY 5 MIN PRN
Status: DISCONTINUED | OUTPATIENT
Start: 2018-07-23 | End: 2018-07-26 | Stop reason: HOSPADM

## 2018-07-23 RX ORDER — FUROSEMIDE 40 MG/1
40 TABLET ORAL DAILY
Status: DISCONTINUED | OUTPATIENT
Start: 2018-07-23 | End: 2018-07-25

## 2018-07-23 RX ORDER — FUROSEMIDE 10 MG/ML
40 INJECTION INTRAMUSCULAR; INTRAVENOUS ONCE
Status: COMPLETED | OUTPATIENT
Start: 2018-07-23 | End: 2018-07-23

## 2018-07-23 RX ORDER — HYDROCODONE BITARTRATE AND ACETAMINOPHEN 10; 325 MG/1; MG/1
1 TABLET ORAL EVERY 6 HOURS PRN
Status: DISCONTINUED | OUTPATIENT
Start: 2018-07-23 | End: 2018-07-26 | Stop reason: HOSPADM

## 2018-07-23 RX ADMIN — ASPIRIN 81 MG: 81 TABLET, COATED ORAL at 08:07

## 2018-07-23 RX ADMIN — CEFTRIAXONE 1 G: 1 INJECTION, SOLUTION INTRAVENOUS at 12:07

## 2018-07-23 RX ADMIN — CEFTRIAXONE 1 G: 1 INJECTION, SOLUTION INTRAVENOUS at 10:07

## 2018-07-23 RX ADMIN — INSULIN ASPART 5 UNITS: 100 INJECTION, SOLUTION INTRAVENOUS; SUBCUTANEOUS at 06:07

## 2018-07-23 RX ADMIN — VANCOMYCIN HYDROCHLORIDE 1000 MG: 1 INJECTION, POWDER, LYOPHILIZED, FOR SOLUTION INTRAVENOUS at 09:07

## 2018-07-23 RX ADMIN — ATORVASTATIN CALCIUM 40 MG: 40 TABLET, FILM COATED ORAL at 10:07

## 2018-07-23 RX ADMIN — LEVOTHYROXINE SODIUM 75 MCG: 25 TABLET ORAL at 06:07

## 2018-07-23 RX ADMIN — AMLODIPINE BESYLATE 5 MG: 5 TABLET ORAL at 08:07

## 2018-07-23 RX ADMIN — INSULIN ASPART 2 UNITS: 100 INJECTION, SOLUTION INTRAVENOUS; SUBCUTANEOUS at 11:07

## 2018-07-23 RX ADMIN — ACETAMINOPHEN 650 MG: 325 TABLET, FILM COATED ORAL at 04:07

## 2018-07-23 RX ADMIN — INSULIN ASPART 1 UNITS: 100 INJECTION, SOLUTION INTRAVENOUS; SUBCUTANEOUS at 10:07

## 2018-07-23 RX ADMIN — FUROSEMIDE 40 MG: 10 INJECTION, SOLUTION INTRAMUSCULAR; INTRAVENOUS at 03:07

## 2018-07-23 RX ADMIN — FUROSEMIDE 40 MG: 40 TABLET ORAL at 08:07

## 2018-07-23 RX ADMIN — SODIUM CHLORIDE: 0.9 INJECTION, SOLUTION INTRAVENOUS at 12:07

## 2018-07-23 RX ADMIN — PANTOPRAZOLE SODIUM 40 MG: 40 TABLET, DELAYED RELEASE ORAL at 08:07

## 2018-07-23 RX ADMIN — INSULIN DETEMIR 58 UNITS: 100 INJECTION, SOLUTION SUBCUTANEOUS at 10:07

## 2018-07-23 RX ADMIN — GABAPENTIN 600 MG: 300 CAPSULE ORAL at 10:07

## 2018-07-23 RX ADMIN — HYDROCODONE BITARTRATE AND ACETAMINOPHEN 1 TABLET: 5; 325 TABLET ORAL at 10:07

## 2018-07-23 RX ADMIN — METOPROLOL TARTRATE 25 MG: 25 TABLET, FILM COATED ORAL at 08:07

## 2018-07-23 RX ADMIN — ENALAPRIL MALEATE 5 MG: 5 TABLET ORAL at 08:07

## 2018-07-23 RX ADMIN — WARFARIN SODIUM 5 MG: 5 TABLET ORAL at 05:07

## 2018-07-23 RX ADMIN — OXYBUTYNIN CHLORIDE 10 MG: 5 TABLET, EXTENDED RELEASE ORAL at 08:07

## 2018-07-23 RX ADMIN — GABAPENTIN 600 MG: 300 CAPSULE ORAL at 08:07

## 2018-07-23 NOTE — PLAN OF CARE
Problem: Patient Care Overview  Goal: Plan of Care Review  Outcome: Ongoing (interventions implemented as appropriate)  Fall prevention precautions maintained, pt remained free of falls throughout shift, call bell and personal items within reach, pt's pain adequately controlled by prn pain medication, catheter placed for nonhealing wounds, pt turned Q2 hours. 24 hour chart check completed. Will continue to monitor

## 2018-07-23 NOTE — ED NOTES
PICC line consents signed by MD Jaeger and pt, witness by RN. Placed in pt's chart. Charge nurse notified.

## 2018-07-23 NOTE — CONSULTS
Daiana Bear [id number] is a 62 y.o. female who has been consulted for vancomycin dosing.  Dx: skin and soft tissue / goal trough 10-15  The patient has the following labs:     Date Creatinine (mg/dl)    BUN WBC Count   7/22/2018 Estimated Creatinine Clearance: 51.3 mL/min (based on SCr of 1.1 mg/dL). Lab Results   Component Value Date    BUN 12 07/22/2018     Lab Results   Component Value Date    WBC 9.51 07/22/2018      which calculates to an Estimated Creatinine Clearance: 51.3 mL/min (based on SCr of 1.1 mg/dL)..       Current weight is 89.1 kg (196 lb 8 oz)    The patient will be started on vancomycin at a dose of 1000 mg every 24 hours. Patient will be followed by pharmacy for changes in renal function, toxicity, and efficacy.  The vancomycin trough has been ordered for 7/24 at 20:30.      Thank you for allowing us to participate in this patient's care.     Vladimir Turner

## 2018-07-23 NOTE — ASSESSMENT & PLAN NOTE
"- Patient denies any current or recent angina/anginal equivalent.   - Initial troponin 0.048, EKG unrevealing.  Follow serial results.   - Check CPK, FLP.  - TTE in AM.  - ASA, BB, and statin.  - Patient followed by Dr. Velázquez ( Cardiology).  Last f/u 2/2017 with "normal" MPI and echo, per patient.  "

## 2018-07-23 NOTE — H&P
"[facility] Hospital Medicine  History & Physical    Patient Name: Daiana Bear  MRN: [id number]  Admission Date: 7/22/2018  Attending Physician: Victoriano Porras MD   Primary Care Provider: Suzanne Henson DO         Patient information was obtained from patient, past medical records and ER records.     Subjective:     Principal Problem:Elevated troponin    Chief Complaint:   Chief Complaint   Patient presents with    Weakness        HPI: History obtained from patient, who is not the best historian.  Ms. Bear is a 63yo female  with a PMHx of CAD with remote PCI of unknown vessel, uncontrolled DM II, HTN, HLD, PVD with remote left AKA, OA, h/o gastric bypass, and h/o provoked DVT x 2.  She presented to the ED with c/o generalized weakness and fatigue that has progressively worsened over the past few weeks.  Associated "blister" to right lower ABD fold, chronic back pain, and chronic right knee pain.  No aggravating or alleviating factors.  Denies any CP, palpitations, SOB, STAFFORD, orthopnea, PND, cough, edema, weight gain, ABD pain or distention, N/V/D, constipation, dysuria, urinary frequency or urgency, hematuria, flank pain, lightheadedness/dizziness, syncope, HA, AMS, focal deficits, fever, chills, recent falls or injury.  Patient reports she is unable to care for herself, therefore, her brother is living with her as a caregiver.  Unfortunately, patient states her brother is unable to properly care for her or prepare meals.  She states she has not eaten anything in 2 days, and does not remember the last time she was bathed.  She admits to being noncompliant with multiple medications, including metoprolol, enalapril, amlodipine, HCTZ, Lasix, Synthroid, and insulin.  She states she never misses her Coumadin.  Work-up in ED resulted INR 2, glucose 329, UA consistent with UTI.  CXR showed nonspecific cardiomediastinal silhouette enlargement, decreased lung volumes, no acute infiltrate.  Right " knee XR showed no acute findings with moderate to severe primary osteoarthritis.  Troponin was ordered in ED for unknown reason, patient denies any angina.  Troponin resulted 0.048, , EKG unrevealing.  Hospital Medicine was called to admit for elevated troponin and panniculitis.  Currently, patient appears comfortable in NAD.  Denies any CP or SOB.      Past Medical History:   Diagnosis Date    Anticoagulant long-term use     Arthritis     Chronic back pain     Coronary artery disease     Diabetes mellitus     History of DVT of lower extremity     Acute post-op DVT x 2    Hyperlipidemia     Hypertension     Overactive bladder     Peripheral neuropathy     PVD (peripheral vascular disease)     Thyroid disease        Past Surgical History:   Procedure Laterality Date    CORONARY ANGIOPLASTY WITH STENT PLACEMENT  2006    GASTRIC BYPASS      HYSTERECTOMY      LEG AMPUTATION THROUGH KNEE Left        Review of patient's allergies indicates:   Allergen Reactions    Ciprofloxacin Hives     Allergic to Cipro    Fluconazole Hives    Prednisone Rash     Rash after taking steroids    Rivaroxaban Rash       No current facility-administered medications on file prior to encounter.      Current Outpatient Prescriptions on File Prior to Encounter   Medication Sig    enalapril (VASOTEC) 5 MG tablet Take 5 mg by mouth.    insulin glargine (LANTUS SOLOSTAR) 100 unit/mL (3 mL) InPn pen INJECT 58 UNITS INTO SKIN EVERY EVENING    oxybutynin (DITROPAN-XL) 10 MG 24 hr tablet Take 10 mg by mouth.    warfarin (COUMADIN) 5 MG tablet Take 5 mg by mouth every Tues, Thurs.     [DISCONTINUED] metoprolol succinate (TOPROL-XL) 50 MG 24 hr tablet Take 50 mg by mouth.    amlodipine (NORVASC) 5 MG tablet Take 5 mg by mouth.    clotrimazole (MYCELEX) 10 mg sruthi Take 10 mg by mouth.    hydrochlorothiazide (HYDRODIURIL) 25 MG tablet Take 25 mg by mouth.    metformin (GLUCOPHAGE) 1000 MG tablet Take 1,000 mg by mouth.     nystatin-triamcinolone (MYCOLOG II) cream Apply topically.    tobramycin sulfate 0.3% (TOBREX) 0.3 % ophthalmic solution 1 drop.     Family History     Problem Relation (Age of Onset)    Diabetes Mother, Sister    Hypertension Father, Mother        Social History Main Topics    Smoking status: Never Smoker    Smokeless tobacco: Never Used    Alcohol use No    Drug use: No    Sexual activity: Not Currently     Partners: Male     Review of Systems   Constitutional: Positive for fatigue. Negative for chills, diaphoresis, fever and unexpected weight change.   HENT: Negative for congestion, sore throat and trouble swallowing.    Eyes: Negative for visual disturbance.   Respiratory: Negative.  Negative for cough, shortness of breath and wheezing.    Cardiovascular: Negative for chest pain, palpitations and leg swelling.   Gastrointestinal: Negative for abdominal distention, abdominal pain, blood in stool, constipation, diarrhea, nausea and vomiting.   Endocrine: Negative for polydipsia, polyphagia and polyuria.   Genitourinary: Negative for difficulty urinating, dysuria, frequency, hematuria and urgency.   Musculoskeletal: Positive for arthralgias (right knee), back pain (chronic) and gait problem. Negative for joint swelling and myalgias.   Skin: Positive for wound (right lower ABD). Negative for pallor and rash.   Neurological: Positive for weakness (generalized). Negative for dizziness, syncope, light-headedness, numbness and headaches.   Psychiatric/Behavioral: Negative for confusion. The patient is not nervous/anxious.    All other systems reviewed and are negative.    Objective:     Vital Signs (Most Recent):  Temp: 98.8 °F (37.1 °C) (07/22/18 2050)  Pulse: 79 (07/22/18 2050)  Resp: 17 (07/22/18 2050)  BP: (!) 142/86 (07/22/18 2050)  SpO2: 96 % (07/22/18 2050) Vital Signs (24h Range):  Temp:  [98.8 °F (37.1 °C)] 98.8 °F (37.1 °C)  Pulse:  [73-80] 79  Resp:  [17-28] 17  SpO2:  [96 %-98 %] 96 %  BP:  (142-189)/(79-87) 142/86     Weight: 89.1 kg (196 lb 8 oz)  Body mass index is 38.38 kg/m².    Physical Exam   Constitutional: She is oriented to person, place, and time. She appears well-developed and well-nourished. No distress.   HENT:   Head: Normocephalic and atraumatic.   Eyes: Conjunctivae are normal.   PERRL; EOM intact.   Neck: Normal range of motion. Neck supple.   Cardiovascular: Normal rate, regular rhythm, S1 normal, S2 normal and intact distal pulses.   No extrasystoles are present. Exam reveals no gallop and no friction rub.    No murmur heard.  Pulses:       Radial pulses are 2+ on the right side, and 2+ on the left side.        Popliteal pulses are 2+ on the right side, and 2+ on the left side.        Dorsalis pedis pulses are 2+ on the right side, and 2+ on the left side.        Posterior tibial pulses are 2+ on the right side, and 2+ on the left side.   Pulmonary/Chest: Effort normal and breath sounds normal. No accessory muscle usage. No tachypnea. No respiratory distress. She has no wheezes. She has no rhonchi. She has no rales.   Abdominal: Soft. Bowel sounds are normal. She exhibits no distension. There is no tenderness. There is no rigidity, no rebound, no guarding and no CVA tenderness.   Musculoskeletal: She exhibits no edema or deformity.        Right knee: She exhibits decreased range of motion and swelling. She exhibits no effusion, no deformity, no erythema, normal patellar mobility and no bony tenderness. No tenderness found.   Left AKA.   Neurological: She is alert and oriented to person, place, and time. No cranial nerve deficit or sensory deficit. GCS eye subscore is 4. GCS verbal subscore is 5. GCS motor subscore is 6.   Skin: Skin is warm and dry. Capillary refill takes less than 2 seconds. No rash noted. She is not diaphoretic. No cyanosis. Nails show no clubbing.        Psychiatric: Her speech is normal. Her affect is blunt. She is slowed. Cognition and memory are normal.    Nursing note and vitals reviewed.              Significant Labs:   Results for orders placed or performed during the hospital encounter of 07/22/18   CBC auto differential   Result Value Ref Range    WBC 9.51 3.90 - 12.70 K/uL    RBC 4.94 4.00 - 5.40 M/uL    Hemoglobin 13.9 12.0 - 16.0 g/dL    Hematocrit 42.2 37.0 - 48.5 %    MCV 85 82 - 98 fL    MCH 28.1 27.0 - 31.0 pg    MCHC 32.9 32.0 - 36.0 g/dL    RDW 13.6 11.5 - 14.5 %    Platelets 230 150 - 350 K/uL    MPV 11.0 9.2 - 12.9 fL    Gran # (ANC) 6.3 1.8 - 7.7 K/uL    Lymph # 2.5 1.0 - 4.8 K/uL    Mono # 0.6 0.3 - 1.0 K/uL    Eos # 0.2 0.0 - 0.5 K/uL    Baso # 0.03 0.00 - 0.20 K/uL    Gran% 66.2 38.0 - 73.0 %    Lymph% 25.8 18.0 - 48.0 %    Mono% 5.8 4.0 - 15.0 %    Eosinophil% 1.9 0.0 - 8.0 %    Basophil% 0.3 0.0 - 1.9 %    Differential Method Automated    Comprehensive metabolic panel   Result Value Ref Range    Sodium 137 136 - 145 mmol/L    Potassium 4.2 3.5 - 5.1 mmol/L    Chloride 100 95 - 110 mmol/L    CO2 26 23 - 29 mmol/L    Glucose 329 (H) 70 - 110 mg/dL    BUN, Bld 12 8 - 23 mg/dL    Creatinine 1.1 0.5 - 1.4 mg/dL    Calcium 9.3 8.7 - 10.5 mg/dL    Total Protein 7.1 6.0 - 8.4 g/dL    Albumin 3.3 (L) 3.5 - 5.2 g/dL    Total Bilirubin 0.4 0.1 - 1.0 mg/dL    Alkaline Phosphatase 87 55 - 135 U/L    AST 19 10 - 40 U/L    ALT 22 10 - 44 U/L    Anion Gap 11 8 - 16 mmol/L    eGFR if African American >60 >60 mL/min/1.73 m^2    eGFR if non African American 53 (A) >60 mL/min/1.73 m^2   Lactic acid, plasma #1   Result Value Ref Range    Lactate (Lactic Acid) 2.0 0.5 - 2.2 mmol/L   Urinalysis   Result Value Ref Range    Specimen UA Urine, Catheterized     Color, UA Yellow Yellow, Straw, Meera    Appearance, UA Cloudy (A) Clear    pH, UA 6.0 5.0 - 8.0    Specific Gravity, UA 1.025 1.005 - 1.030    Protein, UA 2+ (A) Negative    Glucose, UA 3+ (A) Negative    Ketones, UA 1+ (A) Negative    Bilirubin (UA) Negative Negative    Occult Blood UA 2+ (A) Negative     Nitrite, UA Positive (A) Negative    Urobilinogen, UA Negative <2.0 EU/dL    Leukocytes, UA 1+ (A) Negative   Troponin I   Result Value Ref Range    Troponin I 0.048 (H) 0.000 - 0.026 ng/mL   Procalcitonin   Result Value Ref Range    Procalcitonin 0.19 <0.25 ng/mL   Brain natriuretic peptide   Result Value Ref Range     (H) 0 - 99 pg/mL   PTH, intact   Result Value Ref Range    PTH, Intact 71.6 9.0 - 77.0 pg/mL   Urinalysis Microscopic   Result Value Ref Range    RBC, UA 20 (H) 0 - 4 /hpf    WBC, UA >100 (H) 0 - 5 /hpf    Bacteria, UA Many (A) None-Occ /hpf    Yeast, UA None None    Hyaline Casts, UA 0 0-1/lpf /lpf    Microscopic Comment SEE COMMENT    POCT glucose   Result Value Ref Range    POCT Glucose 296 (H) 70 - 110 mg/dL      All pertinent labs within the past 24 hours have been reviewed.    Significant Imaging:   Imaging Results          X-Ray Chest 1 View for PICC_Central line (Final result)  Result time 07/22/18 20:32:12    Final result by Ward Brady MD (07/22/18 20:32:12)                 Impression:      Left PICC line in good position as above.      Electronically signed by: Ward Brady MD  Date:    07/22/2018  Time:    20:32             Narrative:    EXAMINATION:  XR CHEST 1 VIEW    CLINICAL HISTORY:  PICC line placement., Evaluate PICC line placement;    COMPARISON:  07/22/2018    FINDINGS:  Left PICC line has been placed and is in good position with the catheter tip projecting at the superior SVC level.  Otherwise no change.                               X-Ray Chest AP Portable (Final result)  Result time 07/22/18 18:14:05    Final result by Ward Brady MD (07/22/18 18:14:05)                 Impression:      Nonspecific cardiomediastinal silhouette enlargement.  Decreased lung volumes.  No acute infiltrate.      Electronically signed by: Ward Brady MD  Date:    07/22/2018  Time:    18:14             Narrative:    EXAMINATION:  XR CHEST AP PORTABLE    CLINICAL  "HISTORY:  Sepsis.  Respiratory distress., Sepsis;    COMPARISON:  None    FINDINGS:  Large patient with shallow inspiratory effort.  The cardiac silhouette is enlarged.  There is nonspecific widening of the cardiomediastinal silhouette more so on the right than left.  This may be secondary to epidural lipomatosis.    The lung volumes are decreased with elevation of the right diaphragm.                               I have reviewed all pertinent imaging results/findings within the past 24 hours.     EKG: (personally reviewed)  Normal sinus rhythm, nonspecific ST-T abnormality.  No acute ischemic ST-T abnormalities.  No previous to compare.          Assessment/Plan:     * Elevated troponin with CAD h/o PCI in 2006    - Patient denies any current or recent angina/anginal equivalent.   - Initial troponin 0.048, EKG unrevealing.  Follow serial results.   - Check CPK, FLP.  - TTE in AM.  - ASA, BB, and statin.  - Patient followed by Dr. Velázquez ( Cardiology).  Last f/u 2/2017 with "normal" MPI and echo, per patient.        UTI (urinary tract infection)    - Urine culture pending, follow sensitivities.   - Empiric IV Rocephin.        Acute panniculitis    - Likely infective.  - Empiric IV Vanc, pharmacy consult for dosing.  - Wound Care consult in AM.        Cellulitis of right lower extremity without abscess    - Empiric IV Vanc, pharmacy consult for dosing.  - PRN analgesics.  - Wound Care consult in AM.        Debility    - PT/OT consult to eval and treat.  - Social Work consult for DC planning, will need placement.        Noncompliance with medication regimen    - Counseled on importance of compliance.        Uncontrolled type 2 diabetes with peripheral autonomic neuropathy    - Hold metformin, patient no longer taking.  - Continue basal insulin 58 units nightly.  - Accuchecks with SSI.  - Diabetic diet.  - Check HbA1c.        History of DVT on Coumadin therapy    - Therapeutic INR of 2.  Continue Coumadin with daily " INR, pharmacy consult for dosing.        Essential hypertension    - BP stable but above goal due to medication noncompliance.  - Continue enalapril, amlodipine, and metoprolol.  Resume PO Lasix.  Monitor BP trends and titrate as needed.          VTE Risk Mitigation         Ordered     warfarin (COUMADIN) tablet 5 mg  Daily      07/22/18 2137     Place sequential compression device  Until discontinued      07/22/18 2130             Ingris Porter NP  Department of Hospital Medicine   [facility]

## 2018-07-23 NOTE — ASSESSMENT & PLAN NOTE
- Hold metformin, patient no longer taking.  - Continue basal insulin 58 units nightly.  - Accuchecks with SSI.  - Diabetic diet.  - Check HbA1c.

## 2018-07-23 NOTE — HPI
"History obtained from patient, who is not the best historian.  Ms. Bear is a 61yo female with a PMHx of CAD with remote PCI of unknown vessel, uncontrolled DM II, HTN, HLD, PVD with remote left AKA, OA, h/o gastric bypass, and h/o provoked DVT x 2.  She presented to the ED with c/o generalized weakness and fatigue that has progressively worsened over the past few weeks.  Associated "blister" to right lower ABD fold, chronic back pain, and chronic right knee pain.  No aggravating or alleviating factors.  Denies any CP, palpitations, SOB, STAFFORD, orthopnea, PND, cough, edema, weight gain, ABD pain or distention, N/V/D, constipation, dysuria, urinary frequency or urgency, hematuria, flank pain, lightheadedness/dizziness, syncope, HA, AMS, focal deficits, fever, chills, recent falls or injury.  Patient reports she is unable to care for herself, therefore, her brother is living with her as a caregiver.  Unfortunately, patient states her brother is unable to properly care for her or prepare meals.  She states she has not eaten anything in 2 days, and does not remember the last time she was bathed.  She admits to being noncompliant with multiple medications, including metoprolol, enalapril, amlodipine, HCTZ, Lasix, Synthroid, and insulin.  She states she never misses her Coumadin.  Work-up in ED resulted INR 2, glucose 329, UA consistent with UTI.  CXR showed nonspecific cardiomediastinal silhouette enlargement, decreased lung volumes, no acute infiltrate.  Right knee XR showed no acute findings with moderate to severe primary osteoarthritis.  Troponin was ordered in ED for unknown reason, patient denies any angina.  Troponin resulted 0.048, , EKG unrevealing.  Hospital Medicine was called to admit for elevated troponin and panniculitis.  Currently, patient appears comfortable in NAD.  Denies any CP or SOB.  "

## 2018-07-23 NOTE — ED NOTES
Multiple unsuccessful attempts to obtain extra labwork. Dr. Jaeger notified. Patient states she does not want to be stuck again.

## 2018-07-23 NOTE — PT/OT/SLP EVAL
Physical Therapy Evaluation    Patient Name:  Daiana Bear   MRN:  [id number]    Recommendations:     Discharge Recommendations:  nursing facility, skilled   Discharge Equipment Recommendations: none   Barriers to discharge: None    Assessment:     Daiana Bear is a 62 y.o. female admitted with a medical diagnosis of Elevated troponin.  She presents with the following impairments/functional limitations:  weakness, impaired endurance, impaired self care skills, impaired functional mobilty, gait instability, impaired balance, decreased lower extremity function, decreased safety awareness, pain, decreased ROM, impaired joint extensibility.    Rehab Prognosis:  FAIR; patient would benefit from acute skilled PT services to address these deficits and reach maximum level of function.      Recent Surgery: * No surgery found *      Plan:     During this hospitalization, patient to be seen 5 x/week to address the above listed problems via gait training, therapeutic activities, therapeutic exercises  · Plan of Care Expires:  07/30/18   Plan of Care Reviewed with: patient    Subjective     Communicated with EP[facility] prior to session.  Patient found R side-lying upon PT entry to room, agreeable to evaluation.      Chief Complaint: R hip/knee pain  Patient comments/goals: Decrease pain, improve to PLOF  Pain/Comfort:  · Pain Rating 1: 10/10  · Location - Side 1: Right  · Location 1: hip  · Pain Addressed 1: Nurse notified, Distraction, Reposition, Cessation of Activity  · Pain Rating 2: 7/10  · Location - Side 2: Right  · Location 2: knee  · Pain Addressed 2: Nurse notified, Distraction, Cessation of Activity, Reposition    Patients cultural, spiritual, Restorationism conflicts given the current situation: no    Living Environment:  Patient lives with her brother in a first floor apartment with no stairs/steps.  Prior to admission, patients level of function was mostly modified indep with transfers and ADLs with use of DME.   However, patient reports that her brother would assist her at times when needed.  Patient used W/C as main mode of mobility since 2016.  Recently began gait training with L above-knee prosthesis in outpatient therapy setting.  Patient has the following equipment: hospital bed, wheelchair, bath bench, bedside commode, walker, rolling (trapeze on hospital bed).  DME owned (not currently used): none.  Upon discharge, patient will have assistance from her brother.    Objective:     Patient found with: garcia catheter, peripheral IV, telemetry     General Precautions: Standard, fall   Orthopedic Precautions:N/A   Braces: N/A     Exams:  · Cognitive Exam:  Patient is oriented to Person, Place, Time and Situation and follows 100% of multi-step commands   · Gross Motor Coordination:  Decreased  · Postural Exam:  Patient presented with the following abnormalities:    · -       Rounded shoulders  · -       Forward head  · Sensation:    · -       Intact  light/touch R lower leg and foot  · RLE ROM: Deficits: Decreased R ankle DF, knee ext, and hip ext  · RLE Strength: Grossly 4-/5  · LLE ROM: WFL  · LLE Strength: NT    Functional Mobility:  · Bed Mobility:     · Rolling Right: maximal assistance  · Scooting: maximal assistance and of 2 persons  · Supine to Sit: maximal assistance and of 2 persons  · Sit to Supine: maximal assistance and of 2 persons  · Transfers:     · Balance: Poor to Fair - static sitting balance   · *Patient screaming out and crying with all R LE movements due to c/o pain in R hip.  Unable to progress to transfer assessment at this time.      AM-PAC 6 CLICK MOBILITY  Total Score:8       Therapeutic Activities and Exercises:  Patient participated in bed mobility (max assist x 2), seated scooting (max assist x 2), and sitting balance at edge of bed (Poor to Fair -).  Patient in significant pain; limited tolerance for activity.  Nurse notified twice of need for pain meds.    Patient left right sidelying with  all lines intact, call button in reach, bed alarm on and nurse Yola notified.    GOALS:    Physical Therapy Goals        Problem: Physical Therapy Goal    Goal Priority Disciplines Outcome Goal Variances Interventions   Physical Therapy Goal     PT/OT, PT      Description:  LTGs to be met within 7 days (7/30/18):  1.  Patient will perform bed mobility with SBA  2.  Patient will participate in functional transfer assessment  3.  Patient will perform BLE therapeutic exercises 10 x 2 in all available planes  4.  Patient will demo Good static/dynamic sitting balance                    History:     Past Medical History:   Diagnosis Date    Anticoagulant long-term use     Arthritis     Chronic back pain     Coronary artery disease     Diabetes mellitus     History of DVT of lower extremity     Acute post-op DVT x 2    Hyperlipidemia     Hypertension     Overactive bladder     Peripheral neuropathy     PVD (peripheral vascular disease)     Thyroid disease        Past Surgical History:   Procedure Laterality Date    CORONARY ANGIOPLASTY WITH STENT PLACEMENT  2006    GASTRIC BYPASS      HYSTERECTOMY      LEG AMPUTATION THROUGH KNEE Left        Clinical Decision Making:     History  Co-morbidities and personal factors that may impact the plan of care Examination  Body Structures and Functions, activity limitations and participation restrictions that may impact the plan of care Clinical Presentation   Decision Making/ Complexity Score   Co-morbidities:   [] Time since onset of injury / illness / exacerbation  [] Status of current condition  []Patient's cognitive status and safety concerns    [] Multiple Medical Problems (see med hx)  Personal Factors:   [] Patient's age  [] Prior Level of function   [] Patient's home situation (environment and family support)  [] Patient's level of motivation  [] Expected progression of patient      HISTORY:(criteria)    [] 90715 - no personal factors/history    [] 99402 -  has 1-2 personal factor/comorbidity     [] 50543 - has >3 personal factor/comorbidity     Body Regions:  [] Objective examination findings  [] Head     []  Neck  [] Trunk   [] Upper Extremity  [] Lower Extremity    Body Systems:  [] For communication ability, affect, cognition, language, and learning style: the assessment of the ability to make needs known, consciousness, orientation (person, place, and time), expected emotional /behavioral responses, and learning preferences (eg, learning barriers, education  needs)  [] For the neuromuscular system: a general assessment of gross coordinated movement (eg, balance, gait, locomotion, transfers, and transitions) and motor function  (motor control and motor learning)  [] For the musculoskeletal system: the assessment of gross symmetry, gross range of motion, gross strength, height, and weight  [] For the integumentary system: the assessment of pliability(texture), presence of scar formation, skin color, and skin integrity  [] For cardiovascular/pulmonary system: the assessment of heart rate, respiratory rate, blood pressure, and edema     Activity limitations:    [] Patient's cognitive status and saf ety concerns          [] Status of current condition      [] Weight bearing restriction  [] Cardiopulmunary Restriction    Participation Restrictions:   [] Goals and goal agreement with the patient     [] Rehab potential (prognosis) and probable outcome      Examination of Body System: (criteria)    [] 72380 - addressing 1-2 elements    [] 02293 - addressing a total of 3 or more elements     [] 91765 -  Addressing a total of 4 or more elements         Clinical Presentation: (criteria)  Choose one     On examination of body system using standardized tests and measures patient presents with (CHOOSE ONE) elements from any of the following: body structures and functions, activity limitations, and/or participation restrictions.  Leading to a clinical presentation that is  considered (CHOOSE ONE)                              Clinical Decision Making  (Eval Complexity):  Choose One     Time Tracking:     PT Received On: 07/23/18  PT Start Time: 0957     PT Stop Time: 1035  PT Total Time (min): 38 min     Billable Minutes: Evaluation 20 mins and Therapeutic Activity 18 mins      Mena Mercado, PT, DPT  07/23/2018

## 2018-07-23 NOTE — ED NOTES
Report received from Cecille GARRISON. Wound noted to RLQ. Redness noted to vaginal area. Pt denies taking home medications due to increase in hip pain. Unable to start Vancomycin until blood culture 2 collection and PICC line placement. Awaiting MD Jaeger to discuss PICC line placement with pt. Will resume care of pt.

## 2018-07-23 NOTE — CONSULTS
"[facility]  Cardiology  Consult Note    Patient Name: Daiana Bear  MRN: [id number]  Admission Date: 7/22/2018  Hospital Length of Stay: 1 days  Code Status: Full Code   Attending Provider: Victoriano Porras MD   Consulting Provider: Megan Mehta PA-C  Primary Care Physician: Suzanne Henson DO  Principal Problem:Elevated troponin    Patient information was obtained from patient, past medical records and ER records.     Inpatient consult to Cardiology  Consult performed by: MEGAN MEHTA  Consult ordered by: ALBARO COYNE        Subjective:     Chief Complaint: Weakness    HPI:   Ms. Baer is a 62 year old female patient with a PMHx of CAD s/p PCI in 2006, DM type II, HTN, hyperlipidemia, PVD s/p L AKA, OA, gastric bypass, and DVT x 2 (on Coumadin) who presented to [facility] ED yesterday with a chief complaint of fatigue and weakness over the past few weeks, with recent worsening over the past two days. Associated symptoms included an abdominal "blister/sore" and right knee pain. Patient denied any associated fever, chills, SOB, nausea, vomiting, palpitations, chest pain, near syncope, or syncope. Initial workup in ED revealed +UTI and troponin of 0.048 and patient subsequently admitted for further evaluation and treatment. Cardiology consulted due to elevated cardiac enzymes. Patient seen and examined today, lying in bed. Still feels weak, but improved. No cardiac complaints. Remains chest pain free. No increased  SOB. Followed as an OP by Dr. Berry with reported "normal" 2D echo and stress test last year. Admits she is not always compliant with her medications, with exception of Coumadin. Chart reviewed. Troponin 0.048>0.057>0.042. EKG reviewed, no acute ischemic changes appreciated. 2D echo pending.     Past Medical History:   Diagnosis Date    Anticoagulant long-term use     Arthritis     Chronic back pain     Coronary artery disease     Diabetes mellitus     History of DVT " of lower extremity     Acute post-op DVT x 2    Hyperlipidemia     Hypertension     Overactive bladder     Peripheral neuropathy     PVD (peripheral vascular disease)     Thyroid disease        Past Surgical History:   Procedure Laterality Date    CORONARY ANGIOPLASTY WITH STENT PLACEMENT  2006    GASTRIC BYPASS      HYSTERECTOMY      LEG AMPUTATION THROUGH KNEE Left        Review of patient's allergies indicates:   Allergen Reactions    Ciprofloxacin Hives     Allergic to Cipro    Fluconazole Hives    Prednisone Rash     Rash after taking steroids    Rivaroxaban Rash       No current facility-administered medications on file prior to encounter.      Current Outpatient Prescriptions on File Prior to Encounter   Medication Sig    enalapril (VASOTEC) 5 MG tablet Take 5 mg by mouth.    insulin glargine (LANTUS SOLOSTAR) 100 unit/mL (3 mL) InPn pen INJECT 58 UNITS INTO SKIN EVERY EVENING    oxybutynin (DITROPAN-XL) 10 MG 24 hr tablet Take 10 mg by mouth.    warfarin (COUMADIN) 5 MG tablet Take 5 mg by mouth every Tues, Thurs.     [DISCONTINUED] metoprolol succinate (TOPROL-XL) 50 MG 24 hr tablet Take 50 mg by mouth.    amlodipine (NORVASC) 5 MG tablet Take 5 mg by mouth.    clotrimazole (MYCELEX) 10 mg sruthi Take 10 mg by mouth.    hydrochlorothiazide (HYDRODIURIL) 25 MG tablet Take 25 mg by mouth.    metformin (GLUCOPHAGE) 1000 MG tablet Take 1,000 mg by mouth.    nystatin-triamcinolone (MYCOLOG II) cream Apply topically.    tobramycin sulfate 0.3% (TOBREX) 0.3 % ophthalmic solution 1 drop.     Family History     Problem Relation (Age of Onset)    Diabetes Mother, Sister    Hypertension Father, Mother        Social History Main Topics    Smoking status: Never Smoker    Smokeless tobacco: Never Used    Alcohol use No    Drug use: No    Sexual activity: Not Currently     Partners: Male     Review of Systems   Constitution: Positive for weakness and malaise/fatigue.   HENT: Negative.     Eyes: Negative.    Cardiovascular: Negative.    Respiratory: Negative.    Endocrine: Negative.    Hematologic/Lymphatic: Negative.    Skin:        Abdominal blister   Musculoskeletal: Positive for arthritis and joint pain.   Gastrointestinal: Negative.    Genitourinary: Negative.    Psychiatric/Behavioral: Negative.      Objective:     Vital Signs (Most Recent):  Temp: 98.5 °F (36.9 °C) (07/23/18 0748)  Pulse: 85 (07/23/18 0748)  Resp: 18 (07/23/18 0748)  BP: 112/70 (07/23/18 0748)  SpO2: (!) 94 % (07/23/18 0748) Vital Signs (24h Range):  Temp:  [97.7 °F (36.5 °C)-98.8 °F (37.1 °C)] 98.5 °F (36.9 °C)  Pulse:  [73-88] 85  Resp:  [17-28] 18  SpO2:  [94 %-98 %] 94 %  BP: (112-189)/(64-88) 112/70     Weight: 89.1 kg (196 lb 8 oz)  Body mass index is 38.38 kg/m².    SpO2: (!) 94 %  O2 Device (Oxygen Therapy): room air      Intake/Output Summary (Last 24 hours) at 07/23/18 0951  Last data filed at 07/23/18 0717   Gross per 24 hour   Intake           1287.5 ml   Output             1175 ml   Net            112.5 ml       Lines/Drains/Airways     Peripherally Inserted Central Catheter Line                 PICC Double Lumen 07/22/18 1950 left basilic less than 1 day          Drain                 Urethral Catheter 07/22/18 2230 Non-latex less than 1 day          Pressure Ulcer                 Pressure Injury 07/22/18 2045   Buttocks Stage 1 less than 1 day         Pressure Injury 07/22/18 2045 Coccyx Deep tissue injury less than 1 day                Physical Exam   Constitutional: She is oriented to person, place, and time. She appears well-developed and well-nourished. No distress.   HENT:   Head: Normocephalic and atraumatic.   Eyes: Pupils are equal, round, and reactive to light. Right eye exhibits no discharge. Left eye exhibits no discharge.   Neck: Neck supple. No JVD present. No thyromegaly present.   Cardiovascular: Normal rate, regular rhythm, S1 normal, S2 normal and normal heart sounds.    No murmur  heard.  Pulmonary/Chest: Effort normal and breath sounds normal. No respiratory distress. She has no wheezes. She has no rales.   Abdominal: Soft. She exhibits no distension.   +ulcerated lesion lower abdominal wall    Musculoskeletal: She exhibits no edema.   Neurological: She is alert and oriented to person, place, and time.   Skin: Skin is warm and dry. She is not diaphoretic. No erythema.   +erythema RLE   Psychiatric: She has a normal mood and affect. Her behavior is normal. Thought content normal.   Nursing note and vitals reviewed.      Significant Labs:   CMP   Recent Labs  Lab 07/22/18 1806 07/23/18 0615    137   K 4.2 3.8    99   CO2 26 27   * 331*   BUN 12 15   CREATININE 1.1 1.2   CALCIUM 9.3 8.6*   PROT 7.1  --    ALBUMIN 3.3*  --    BILITOT 0.4  --    ALKPHOS 87  --    AST 19  --    ALT 22  --    ANIONGAP 11 11   ESTGFRAFRICA >60 55*   EGFRNONAA 53* 48*   , CBC   Recent Labs  Lab 07/22/18 1806 07/23/18  0615   WBC 9.51 7.92   HGB 13.9 12.7   HCT 42.2 39.1    219   , INR   Recent Labs  Lab 07/22/18 1806 07/23/18 0615   INR 2.0* 2.3*   , Troponin   Recent Labs  Lab 07/22/18 1806 07/23/18  0040 07/23/18 0615   TROPONINI 0.048* 0.057* 0.042*    and All pertinent lab results from the last 24 hours have been reviewed.    Significant Imaging: Echocardiogram: 2D echo with color flow doppler: No results found for this or any previous visit., EKG: Reviewed and X-Ray: CXR: X-Ray Chest 1 View (CXR):   Results for orders placed or performed during the hospital encounter of 07/22/18   X-Ray Chest 1 View for PICC_Central line    Narrative    EXAMINATION:  XR CHEST 1 VIEW    CLINICAL HISTORY:  PICC line placement., Evaluate PICC line placement;    COMPARISON:  07/22/2018    FINDINGS:  Left PICC line has been placed and is in good position with the catheter tip projecting at the superior SVC level.  Otherwise no change.      Impression    Left PICC line in good position as  above.      Electronically signed by: Ward Brady MD  Date:    07/22/2018  Time:    20:32    and X-Ray Chest PA and Lateral (CXR): No results found for this visit on 07/22/18.    Assessment and Plan:   Patient with cardiac history who presents with elevated troponin secondary to demand ischemia from underlying infection/debilitiy issues. Remains chest pain free. Continue current meds (on good home regimen). Check 2D echo. Will plan for MPI stress test if EF normal.    * Elevated troponin with CAD h/o PCI in 2006    -Troponin elevated but flat, 0.048>0.057>0.042  -Suspect related to demand ischemia from underlying UTI/panniculitis  -Denies chest pain/SOB/anginal symptoms  -2D echo pending  -Continue home medications-ASA, Plavix, ACEI, statin  -Counseled on medication compliance  -Will proceed with MPI stress test if EF normal        Cellulitis of right lower extremity without abscess    -Mgmt as per hospital medicine        UTI (urinary tract infection)    -Mgmt as per hospital medicine        History of DVT on Coumadin therapy    -Continue Coumadin           Essential hypertension    -Continue amlodipine, Enalapril, Lopressor        Acute panniculitis    -Mgmt as per hospital medicine, on abx            VTE Risk Mitigation         Ordered     warfarin (COUMADIN) tablet 5 mg  Daily      07/22/18 2137     Place sequential compression device  Until discontinued      07/22/18 2130          Thank you for your consult. I will follow-up with patient. Please contact us if you have any additional questions.    Megan Sneed PA-C  Cardiology   [facility]     Chart reviewed. Dr. Edgar examined patient and agrees with plan as outlined above.

## 2018-07-23 NOTE — ASSESSMENT & PLAN NOTE
-Troponin elevated but flat, 0.048>0.057>0.042  -Suspect related to demand ischemia from underlying UTI/panniculitis  -Denies chest pain/SOB/anginal symptoms  -2D echo pending  -Continue home medications-ASA, Plavix, ACEI, statin  -Counseled on medication compliance  -Will proceed with MPI stress test if EF normal

## 2018-07-23 NOTE — PROGRESS NOTES
Faxed over release of medical information form signed by the patient to [facility], Dr. Velázquez. Waiting on information.

## 2018-07-23 NOTE — HPI
"Ms. Bear is a 62 year old female patient with a PMHx of CAD s/p PCI in 2006, DM type II, HTN, hyperlipidemia, PVD s/p L AKA, OA, gastric bypass, and DVT x 2 (on Coumadin) who presented to [facility] ED yesterday with a chief complaint of fatigue and weakness over the past few weeks, with recent worsening over the past two days. Associated symptoms included an abdominal "blister/sore" and right knee pain. Patient denied any associated fever, chills, SOB, nausea, vomiting, palpitations, chest pain, near syncope, or syncope. Initial workup in ED revealed +UTI and troponin of 0.048 and patient subsequently admitted for further evaluation and treatment. Cardiology consulted due to elevated cardiac enzymes. Patient seen and examined today, lying in bed. Still feels weak, but improved. No cardiac complaints. Remains chest pain free. No increased  SOB. Followed as an OP by Dr. Berry with reported "normal" 2D echo and stress test last year. Admits she is not always compliant with her medications, with exception of Coumadin. Chart reviewed. Troponin 0.048>0.057>0.042. EKG reviewed, no acute ischemic changes appreciated. 2D echo pending.   "

## 2018-07-23 NOTE — PROGRESS NOTES
Clinical Pharmacy Progress Note: Coumadin Dosing and Monitoring     Goal INR: 2-3  Indication: History of DVT x 2   Lab Results   Component Value Date    INR 2.3 (H) 07/23/2018    INR 2.0 (H) 07/22/2018     Patient has not yet been educated by pharmacist this admission.     Current dose: Patient's home dose is currently unclear. Per [facility] she has filled warfarin 4 mg most recently but appears to only fill it once every two months instead of monthly. INR increase of 0.3 indicates we may have a higher jump in INR tomorrow; will proceed cautiously. Will attempt to clarify home dose at patient education. Per admission note patient is poor historian.     Plan: PT/INR will be monitored daily. Dose adjustments will be made accordingly.      Thank you for allowing us to participate in this patient's care.    Jayla Almanza, PharmD 7/23/2018 7:55 AM

## 2018-07-23 NOTE — PLAN OF CARE
Sw met with pt at bedside to complete assessment. Pt reports she needs assistance with an overhead trapeze. Pt reports she goes to Physical Therapy 2x weekly at [facility]. Sw provided pt with Transitional Navigator. No further needs at this time. Sw will continue to f/u PRN.     07/23/18 1355   Discharge Assessment   Assessment Type Discharge Planning Assessment   Confirmed/corrected address and phone number on facesheet? Yes   Assessment information obtained from? Patient   Prior to hospitilization cognitive status: Alert/Oriented   Prior to hospitalization functional status: Assistive Equipment;Needs Assistance   Current cognitive status: Alert/Oriented   Current Functional Status: Assistive Equipment;Needs Assistance   Lives With sibling(s)  (Lives with brother.)   Able to Return to Prior Arrangements yes   Is patient able to care for self after discharge? Yes   Who are your caregiver(s) and their phone number(s)? parviz Green [phone]   Patient's perception of discharge disposition home or selfcare   Readmission Within The Last 30 Days no previous admission in last 30 days   Patient currently being followed by outpatient case management? No   Patient currently receives any other outside agency services? Yes   Name and contact number of agency or person providing outside services [facility] in [place]. Pt goes 2x weekly   Is it the patient/care giver preference to resume care with the current outside agency? Yes   Equipment Currently Used at Home wheelchair   Do you have any problems affording any of your prescribed medications? No   Is the patient taking medications as prescribed? yes   Does the patient have transportation home? Yes  (Parviz Powers will provide dc transportation.)   Transportation Available car   Does the patient receive services at the [facility]madin Clinic? No   Discharge Plan A Home with family   Patient/Family In Agreement With Plan yes

## 2018-07-23 NOTE — PLAN OF CARE
Problem: Patient Care Overview  Goal: Plan of Care Review  Outcome: Ongoing (interventions implemented as appropriate)  Patient remains free of falls and safety precautions maintained. Afebrile. Pain controlled. Monitoring BP. Patient turned q2hr with wedge use. Patient is now on size wise bed. [place]son care complete. Accu check per order. NSR. Will continue to monitor. 12 hour chart check.

## 2018-07-23 NOTE — CONSULTS
07/23/18 1035   Handoff Report   Given To CYDNEY Aceves   Pain/Comfort Assessments   Pain Assessment Performed Yes       Number Scale   Presence of Pain complains of pain/discomfort   Location - Side Right   Location hip   Pain Rating: Rest 6   Skin   Skin WDL ex   Skin Color Red (erythema)   Skin Temperature warm   Skin Moisture Dry   Skin Elasticity Sluggish   Isai Risk Assessment   Sensory Perception 3-->slightly limited   Moisture 2-->very moist   Activity 2-->chairfast   Mobility 2-->very limited   Nutrition 3-->adequate   Friction and Shear 1-->problem   Isai Score 13       Wound 07/22/18 2045 Ulceration lower Abdomen   Date First Assessed/Time First Assessed: 07/22/18 2045   Pre-existing: Yes  Primary Wound Type: Ulceration  Side: Right  Orientation: lower  Location: Abdomen   Wound WDL ex   Dressing Appearance Dry;Intact   Drainage Amount None   Appearance Tan;Slough   Tissue loss description Full thickness   Periwound Area Redness   Wound Length (cm) 1.5 cm   Wound Width (cm) 1.5 cm   Care Cleansed with:;Sterile normal saline;Applied:;Skin Barrier   Dressing Transparent film   Dressing Change Due 07/26/18       Wound 07/23/18 1035 Incontinence associated dermatitis Gluteal cleft   Date First Assessed/Time First Assessed: 07/23/18 1035   Pre-existing: Yes  Primary Wound Type: Incontinence associated dermatitis  Location: (c) Gluteal cleft   Wound WDL ex   Dressing Appearance Open to air   Drainage Amount Scant   Drainage Characteristics/Odor Serous   Appearance Red;Moist   Tissue loss description Partial thickness   Red (%), Wound Tissue Color 100 %   Periwound Area Redness;Moist;Denuded   Wound Edges Open   Care Cleansed with:;Soap and water;Applied:;Skin Barrier  (critic aid paste barrier)       Pressure Injury 07/22/18 2045 Coccyx Deep tissue injury   Date First Assessed/Time First Assessed: 07/22/18 2045   Pressure Injury Present on Admission: yes  Location: Coccyx  Staging: Deep tissue injury    Staging D   Dressing Appearance Open to air   Drainage Amount None   Appearance Maroon;Purple   Care Cleansed with:;Soap and water;Applied:;Skin Barrier   Skin Interventions   Pressure Reduction Devices Pressure-redistributing mattress utilized;Foam padding utilized   Pressure Reduction Techniques frequent weight shift encouraged;heels elevated off bed;positioned off wounds   Skin Protection Adhesive use limited;Incontinence pads;Skin sealant/moisture barrier   Positioning   Body Position side-lying, left   Head of Bed (HOB) HOB at 20 degrees   Positioning/Transfer Devices pillows;wedge;applied     Consulted on this 61 y/o F patient due to present on admission skin breakdown. patient admitted from home with weakness.   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  She has chronic venous stasis dermatitis to LLE with dry thickened scaly skin and redness noted to LLL. Patient refuses for me to apply sween 24 cream to LLE at this time. She has 2+ edema to LLE as well. Her left heel is reddened and blanchable. Painted with cavilon and floated on pillow.  Patient's abdomen assessed. Left lower pannus ulcer noted with moist tan adherent slough covered wound bed measuring 1.5x1.5cm. Mild rosalia wound erythema noted extending out 0.5cm circumferentially. No fluctuance or induration noted.  Patient denies pain to site.  Cleansed with saline and patted dry. Painted with cavilon, and covered with transparent film.  Patient then turned with max assist to left side.  sDTI noted to coccygeal region with maroon discoloration that is nonblanchable.  IAD also noted to gluteal fold extending down into perineum.  Partial thickness tissue loss with moist red wound beds noted.  Cleansed all with wipes and patted dry. Thin layer critic aid paste barrier applied.  Patient then positioned with foam wedge on left side.  Dr. Porras at bedside during assessment.  Please see below for wound care recommendations:    IAD/DTI to  coccyx:  1. Cleanse with bath wipes  2. Pat dry  3. Apply thin layer critic aid paste barrier BID and prn    LLQ ulcer:  1. Cleanse with saline  2. Pat dry  3. Paint with cavilon  4. Apply transparent film dressing  5. Change every 3 days

## 2018-07-23 NOTE — PROCEDURES
"Daiana Bear is a 62 y.o. female patient.    Temp: 98.8 °F (37.1 °C) (07/22/18 1719)  Pulse: 80 (07/22/18 1919)  Resp: (!) 28 (07/22/18 1919)  BP: (!) 145/87 (07/22/18 1919)  SpO2: 98 % (07/22/18 1919)  Weight: 89.1 kg (196 lb 8 oz) (07/22/18 1832)  Height: 5' 3" (160 cm) (07/22/18 1832)    PICC  Date/Time: 7/22/2018 7:50 PM  Location procedure was performed: [facility] PICC LINE PLACEMENT  Performed by: NELSON QUIÑONES  Time out: Immediately prior to procedure a time out was called to verify the correct patient, procedure, equipment, support staff and site/side marked as required  Indications: med administration  Anesthesia: local infiltration  Local anesthetic: lidocaine 1% without epinephrine  Anesthetic Total (mL): 5  Preparation: skin prepped with ChloraPrep  Skin prep agent dried: skin prep agent completely dried prior to procedure  Sterile barriers: all five maximum sterile barriers used - cap, mask, sterile gown, sterile gloves, and large sterile sheet  Hand hygiene: hand hygiene performed prior to central venous catheter insertion  Location details: left basilic  Catheter type: double lumen  Catheter size: 5 Fr  Catheter Length: 45cm    Ultrasound guidance: yes  Vessel Caliber: medium and patent, compressibility normal  Vascular Doppler: not done  Needle advanced into vessel with real time Ultrasound guidance.  Guidewire confirmed in vessel.  Sterile sheath used.  no esophageal manometryNumber of attempts: 1  Post-procedure: blood return through all ports, chlorhexidine patch and sterile dressing applied  Estimated blood loss (mL): 0  Specimens: No  Implants: No  Assessment: placement verified by x-ray and successful placement  Complications: none          Nelson Quiñones  7/22/2018  "

## 2018-07-23 NOTE — PT/OT/SLP EVAL
Occupational Therapy   Evaluation    Name: Daiana Bear  MRN: [id number]  Admitting Diagnosis:  UTI (urinary tract infection)      Recommendations:     Discharge Recommendations: nursing facility, skilled  Discharge Equipment Recommendations:  none  Barriers to discharge:  None    History:     Occupational Profile:  Living Environment: lives with brother in 1 story house with 24 hour care  Previous level of function: (i) with adl's and mod (i) with functional mobility and t/f's  Roles and Routines: occupational therapy  Equipment Owned:  wheelchair, hospital bed  Assistance upon Discharge:     Past Medical History:   Diagnosis Date    Anticoagulant long-term use     Arthritis     Chronic back pain     Coronary artery disease     Diabetes mellitus     History of DVT of lower extremity     Acute post-op DVT x 2    Hyperlipidemia     Hypertension     Overactive bladder     Peripheral neuropathy     PVD (peripheral vascular disease)     Thyroid disease        Past Surgical History:   Procedure Laterality Date    CORONARY ANGIOPLASTY WITH STENT PLACEMENT  2006    GASTRIC BYPASS      HYSTERECTOMY      LEG AMPUTATION THROUGH KNEE Left        Subjective     Chief Complaint: debility and generalized weakness  Patient/Family stated goals:   Communicated with: nurse manning and epic chart review prior to session.  Pain/Comfort:  · Pain Rating 1: 10/10  · Location - Side 1: Right  · Location - Orientation 1: lower  · Location 1: leg  · Pain Addressed 1: Reposition, Distraction, Nurse notified    Patients cultural, spiritual, Adventist conflicts given the current situation:      Objective:     Patient found with: garcia catheter, telemetry, peripheral IV    General Precautions: Standard, fall   Orthopedic Precautions:N/A   Braces: N/A     Occupational Performance:    Bed Mobility:    · Patient completed Rolling/Turning to Right with maximal assistance  · Patient completed Scooting/Bridging with maximal  "assistance  · Patient completed Supine to Sit with maximal assistance    Activities of Daily Living:  · Upper Body Dressing: maximal assistance x1  · Lower Body Dressing: total assistance x1    Cognitive/Visual Perceptual:  Cognitive/Psychosocial Skills:     -       Oriented to: Person, Place, Time and Situation   -       Follows Commands/attention:Follows multistep  commands  -       Communication: clear/fluent  -       Memory: No Deficits noted  -       Safety awareness/insight to disability: impaired   Visual/Perceptual:      -Intact    Physical Exam:  Upper Extremity Range of Motion:     -       Right Upper Extremity: WFL  -       Left Upper Extremity: WFL  Upper Extremity Strength:    -       Right Upper Extremity: Deficits: mmt: 3/5 GROSSLY  -       Left Upper Extremity: Deficits: MMT: 3/5 GROSSLY   Strength:    -       Right Upper Extremity: Deficits: MMT: 3/5 GROSSLY  -       Left Upper Extremity: Deficits: MMT: 3/5 GROSSLY    Patient left HOB elevated with all lines intact, call button in reach and NURSE Mariama AND CORAZON notified    [facility] 6 Click:  [facility] Total Score: 13    Treatment & Education:    Education:    Assessment:     Daiana Bear is a 62 y.o. female with a medical diagnosis of UTI (urinary tract infection).  She presents with Performance deficits affecting function are weakness, impaired self care skills, impaired balance, decreased safety awareness, impaired endurance, impaired functional mobilty, decreased upper extremity function, gait instability, decreased lower extremity function.      Rehab Prognosis:  FAIR+; patient would benefit from acute skilled OT services to address these deficits and reach maximum level of function.         Clinical Decision Making:     3.  OT High:  "Pt evaluation falls under high complexity for evaluation category due to 5+ performance deficits identified with comprehensive assessments and significant modifications/assistance required. An expanded review of " "history and occupational profile completed in addition to expanded review of physical, cognitive and psychosocial history. Several treatment options considered in care."     Plan:     Patient to be seen 3 x/week to address the above listed problems via self-care/home management, therapeutic activities, therapeutic exercises  · Plan of Care Expires: 07/30/18  · Plan of Care Reviewed with: patient    This Plan of care has been discussed with the patient who was involved in its development and understands and is in agreement with the identified goals and treatment plan    GOALS:    Occupational Therapy Goals        Problem: Occupational Therapy Goal    Goal Priority Disciplines Outcome Interventions   Occupational Therapy Goal     OT, PT/OT     Description:  Goals to be met by: 7-30-18    Patient will increase functional independence with ADLs by performing:    UE Dressing with Stand-by Assistance.  LE Dressing with Stand-by Assistance.  Upper extremity exercise program x 15 reps min resistance                      Time Tracking:     OT Date of Treatment: 07/23/18  OT Start Time: 1655  OT Stop Time: 1725  OT Total Time (min): 30 min    Billable Minutes:Evaluation 15 MINUTES  Therapeutic Activity 15 MINUTES    Gayathri Sibley OT  7/23/2018    "

## 2018-07-23 NOTE — ASSESSMENT & PLAN NOTE
- BP stable but above goal due to medication noncompliance.  - Continue enalapril, amlodipine, and metoprolol.  Resume PO Lasix.  Monitor BP trends and titrate as needed.

## 2018-07-23 NOTE — SUBJECTIVE & OBJECTIVE
Past Medical History:   Diagnosis Date    Anticoagulant long-term use     Arthritis     Chronic back pain     Coronary artery disease     Diabetes mellitus     History of DVT of lower extremity     Acute post-op DVT x 2    Hyperlipidemia     Hypertension     Overactive bladder     Peripheral neuropathy     PVD (peripheral vascular disease)     Thyroid disease        Past Surgical History:   Procedure Laterality Date    CORONARY ANGIOPLASTY WITH STENT PLACEMENT  2006    GASTRIC BYPASS      HYSTERECTOMY      LEG AMPUTATION THROUGH KNEE Left        Review of patient's allergies indicates:   Allergen Reactions    Ciprofloxacin Hives     Allergic to Cipro    Fluconazole Hives    Prednisone Rash     Rash after taking steroids    Rivaroxaban Rash       No current facility-administered medications on file prior to encounter.      Current Outpatient Prescriptions on File Prior to Encounter   Medication Sig    enalapril (VASOTEC) 5 MG tablet Take 5 mg by mouth.    insulin glargine (LANTUS SOLOSTAR) 100 unit/mL (3 mL) InPn pen INJECT 58 UNITS INTO SKIN EVERY EVENING    oxybutynin (DITROPAN-XL) 10 MG 24 hr tablet Take 10 mg by mouth.    warfarin (COUMADIN) 5 MG tablet Take 5 mg by mouth every Tues, Thurs.     [DISCONTINUED] metoprolol succinate (TOPROL-XL) 50 MG 24 hr tablet Take 50 mg by mouth.    amlodipine (NORVASC) 5 MG tablet Take 5 mg by mouth.    clotrimazole (MYCELEX) 10 mg sruthi Take 10 mg by mouth.    hydrochlorothiazide (HYDRODIURIL) 25 MG tablet Take 25 mg by mouth.    metformin (GLUCOPHAGE) 1000 MG tablet Take 1,000 mg by mouth.    nystatin-triamcinolone (MYCOLOG II) cream Apply topically.    tobramycin sulfate 0.3% (TOBREX) 0.3 % ophthalmic solution 1 drop.     Family History     Problem Relation (Age of Onset)    Diabetes Mother, Sister    Hypertension Father, Mother        Social History Main Topics    Smoking status: Never Smoker    Smokeless tobacco: Never Used    Alcohol use  No    Drug use: No    Sexual activity: Not Currently     Partners: Male     Review of Systems   Constitution: Positive for weakness and malaise/fatigue.   HENT: Negative.    Eyes: Negative.    Cardiovascular: Negative.    Respiratory: Negative.    Endocrine: Negative.    Hematologic/Lymphatic: Negative.    Skin:        Abdominal blister   Musculoskeletal: Positive for arthritis and joint pain.   Gastrointestinal: Negative.    Genitourinary: Negative.    Psychiatric/Behavioral: Negative.      Objective:     Vital Signs (Most Recent):  Temp: 98.5 °F (36.9 °C) (07/23/18 0748)  Pulse: 85 (07/23/18 0748)  Resp: 18 (07/23/18 0748)  BP: 112/70 (07/23/18 0748)  SpO2: (!) 94 % (07/23/18 0748) Vital Signs (24h Range):  Temp:  [97.7 °F (36.5 °C)-98.8 °F (37.1 °C)] 98.5 °F (36.9 °C)  Pulse:  [73-88] 85  Resp:  [17-28] 18  SpO2:  [94 %-98 %] 94 %  BP: (112-189)/(64-88) 112/70     Weight: 89.1 kg (196 lb 8 oz)  Body mass index is 38.38 kg/m².    SpO2: (!) 94 %  O2 Device (Oxygen Therapy): room air      Intake/Output Summary (Last 24 hours) at 07/23/18 0951  Last data filed at 07/23/18 0717   Gross per 24 hour   Intake           1287.5 ml   Output             1175 ml   Net            112.5 ml       Lines/Drains/Airways     Peripherally Inserted Central Catheter Line                 PICC Double Lumen 07/22/18 1950 left basilic less than 1 day          Drain                 Urethral Catheter 07/22/18 2230 Non-latex less than 1 day          Pressure Ulcer                 Pressure Injury 07/22/18 2045   Buttocks Stage 1 less than 1 day         Pressure Injury 07/22/18 2045 Coccyx Deep tissue injury less than 1 day                Physical Exam   Constitutional: She is oriented to person, place, and time. She appears well-developed and well-nourished. No distress.   HENT:   Head: Normocephalic and atraumatic.   Eyes: Pupils are equal, round, and reactive to light. Right eye exhibits no discharge. Left eye exhibits no discharge.    Neck: Neck supple. No JVD present. No thyromegaly present.   Cardiovascular: Normal rate, regular rhythm, S1 normal, S2 normal and normal heart sounds.    No murmur heard.  Pulmonary/Chest: Effort normal and breath sounds normal. No respiratory distress. She has no wheezes. She has no rales.   Abdominal: Soft. She exhibits no distension.   +ulcerated lesion lower abdominal wall    Musculoskeletal: She exhibits no edema.   Neurological: She is alert and oriented to person, place, and time.   Skin: Skin is warm and dry. She is not diaphoretic. No erythema.   +erythema RLE   Psychiatric: She has a normal mood and affect. Her behavior is normal. Thought content normal.   Nursing note and vitals reviewed.      Significant Labs:   CMP   Recent Labs  Lab 07/22/18  1806 07/23/18  0615    137   K 4.2 3.8    99   CO2 26 27   * 331*   BUN 12 15   CREATININE 1.1 1.2   CALCIUM 9.3 8.6*   PROT 7.1  --    ALBUMIN 3.3*  --    BILITOT 0.4  --    ALKPHOS 87  --    AST 19  --    ALT 22  --    ANIONGAP 11 11   ESTGFRAFRICA >60 55*   EGFRNONAA 53* 48*   , CBC   Recent Labs  Lab 07/22/18  1806 07/23/18  0615   WBC 9.51 7.92   HGB 13.9 12.7   HCT 42.2 39.1    219   , INR   Recent Labs  Lab 07/22/18  1806 07/23/18  0615   INR 2.0* 2.3*   , Troponin   Recent Labs  Lab 07/22/18  1806 07/23/18  0040 07/23/18  0615   TROPONINI 0.048* 0.057* 0.042*    and All pertinent lab results from the last 24 hours have been reviewed.    Significant Imaging: Echocardiogram: 2D echo with color flow doppler: No results found for this or any previous visit., EKG: Reviewed and X-Ray: CXR: X-Ray Chest 1 View (CXR):   Results for orders placed or performed during the hospital encounter of 07/22/18   X-Ray Chest 1 View for PICC_Central line    Narrative    EXAMINATION:  XR CHEST 1 VIEW    CLINICAL HISTORY:  PICC line placement., Evaluate PICC line placement;    COMPARISON:  07/22/2018    FINDINGS:  Left PICC line has been placed and is  in good position with the catheter tip projecting at the superior SVC level.  Otherwise no change.      Impression    Left PICC line in good position as above.      Electronically signed by: Ward Brady MD  Date:    07/22/2018  Time:    20:32    and X-Ray Chest PA and Lateral (CXR): No results found for this visit on 07/22/18.

## 2018-07-23 NOTE — SUBJECTIVE & OBJECTIVE
Past Medical History:   Diagnosis Date    Anticoagulant long-term use     Arthritis     Chronic back pain     Coronary artery disease     Diabetes mellitus     History of DVT of lower extremity     Acute post-op DVT x 2    Hyperlipidemia     Hypertension     Overactive bladder     Peripheral neuropathy     PVD (peripheral vascular disease)     Thyroid disease        Past Surgical History:   Procedure Laterality Date    CORONARY ANGIOPLASTY WITH STENT PLACEMENT  2006    GASTRIC BYPASS      HYSTERECTOMY      LEG AMPUTATION THROUGH KNEE Left        Review of patient's allergies indicates:   Allergen Reactions    Ciprofloxacin Hives     Allergic to Cipro    Fluconazole Hives    Prednisone Rash     Rash after taking steroids    Rivaroxaban Rash       No current facility-administered medications on file prior to encounter.      Current Outpatient Prescriptions on File Prior to Encounter   Medication Sig    enalapril (VASOTEC) 5 MG tablet Take 5 mg by mouth.    insulin glargine (LANTUS SOLOSTAR) 100 unit/mL (3 mL) InPn pen INJECT 58 UNITS INTO SKIN EVERY EVENING    oxybutynin (DITROPAN-XL) 10 MG 24 hr tablet Take 10 mg by mouth.    warfarin (COUMADIN) 5 MG tablet Take 5 mg by mouth every Tues, Thurs.     [DISCONTINUED] metoprolol succinate (TOPROL-XL) 50 MG 24 hr tablet Take 50 mg by mouth.    amlodipine (NORVASC) 5 MG tablet Take 5 mg by mouth.    clotrimazole (MYCELEX) 10 mg sruthi Take 10 mg by mouth.    hydrochlorothiazide (HYDRODIURIL) 25 MG tablet Take 25 mg by mouth.    metformin (GLUCOPHAGE) 1000 MG tablet Take 1,000 mg by mouth.    nystatin-triamcinolone (MYCOLOG II) cream Apply topically.    tobramycin sulfate 0.3% (TOBREX) 0.3 % ophthalmic solution 1 drop.     Family History     Problem Relation (Age of Onset)    Diabetes Mother, Sister    Hypertension Father, Mother        Social History Main Topics    Smoking status: Never Smoker    Smokeless tobacco: Never Used    Alcohol use  No    Drug use: No    Sexual activity: Not Currently     Partners: Male     Review of Systems   Constitutional: Positive for fatigue. Negative for chills, diaphoresis, fever and unexpected weight change.   HENT: Negative for congestion, sore throat and trouble swallowing.    Eyes: Negative for visual disturbance.   Respiratory: Negative.  Negative for cough, shortness of breath and wheezing.    Cardiovascular: Negative for chest pain, palpitations and leg swelling.   Gastrointestinal: Negative for abdominal distention, abdominal pain, blood in stool, constipation, diarrhea, nausea and vomiting.   Endocrine: Negative for polydipsia, polyphagia and polyuria.   Genitourinary: Negative for difficulty urinating, dysuria, frequency, hematuria and urgency.   Musculoskeletal: Positive for arthralgias (right knee), back pain (chronic) and gait problem. Negative for joint swelling and myalgias.   Skin: Positive for wound (right lower ABD). Negative for pallor and rash.   Neurological: Positive for weakness (generalized). Negative for dizziness, syncope, light-headedness, numbness and headaches.   Psychiatric/Behavioral: Negative for confusion. The patient is not nervous/anxious.    All other systems reviewed and are negative.    Objective:     Vital Signs (Most Recent):  Temp: 98.8 °F (37.1 °C) (07/22/18 2050)  Pulse: 79 (07/22/18 2050)  Resp: 17 (07/22/18 2050)  BP: (!) 142/86 (07/22/18 2050)  SpO2: 96 % (07/22/18 2050) Vital Signs (24h Range):  Temp:  [98.8 °F (37.1 °C)] 98.8 °F (37.1 °C)  Pulse:  [73-80] 79  Resp:  [17-28] 17  SpO2:  [96 %-98 %] 96 %  BP: (142-189)/(79-87) 142/86     Weight: 89.1 kg (196 lb 8 oz)  Body mass index is 38.38 kg/m².    Physical Exam   Constitutional: She is oriented to person, place, and time. She appears well-developed and well-nourished. No distress.   HENT:   Head: Normocephalic and atraumatic.   Eyes: Conjunctivae are normal.   PERRL; EOM intact.   Neck: Normal range of motion. Neck  supple.   Cardiovascular: Normal rate, regular rhythm, S1 normal, S2 normal and intact distal pulses.   No extrasystoles are present. Exam reveals no gallop and no friction rub.    No murmur heard.  Pulses:       Radial pulses are 2+ on the right side, and 2+ on the left side.        Popliteal pulses are 2+ on the right side, and 2+ on the left side.        Dorsalis pedis pulses are 2+ on the right side, and 2+ on the left side.        Posterior tibial pulses are 2+ on the right side, and 2+ on the left side.   Pulmonary/Chest: Effort normal and breath sounds normal. No accessory muscle usage. No tachypnea. No respiratory distress. She has no wheezes. She has no rhonchi. She has no rales.   Abdominal: Soft. Bowel sounds are normal. She exhibits no distension. There is no tenderness. There is no rigidity, no rebound, no guarding and no CVA tenderness.   Musculoskeletal: She exhibits no edema or deformity.        Right knee: She exhibits decreased range of motion and swelling. She exhibits no effusion, no deformity, no erythema, normal patellar mobility and no bony tenderness. No tenderness found.   Left AKA.   Neurological: She is alert and oriented to person, place, and time. No cranial nerve deficit or sensory deficit. GCS eye subscore is 4. GCS verbal subscore is 5. GCS motor subscore is 6.   Skin: Skin is warm and dry. Capillary refill takes less than 2 seconds. No rash noted. She is not diaphoretic. No cyanosis. Nails show no clubbing.        Psychiatric: Her speech is normal. Her affect is blunt. She is slowed. Cognition and memory are normal.   Nursing note and vitals reviewed.              Significant Labs:   Results for orders placed or performed during the hospital encounter of 07/22/18   CBC auto differential   Result Value Ref Range    WBC 9.51 3.90 - 12.70 K/uL    RBC 4.94 4.00 - 5.40 M/uL    Hemoglobin 13.9 12.0 - 16.0 g/dL    Hematocrit 42.2 37.0 - 48.5 %    MCV 85 82 - 98 fL    MCH 28.1 27.0 - 31.0 pg     MCHC 32.9 32.0 - 36.0 g/dL    RDW 13.6 11.5 - 14.5 %    Platelets 230 150 - 350 K/uL    MPV 11.0 9.2 - 12.9 fL    Gran # (ANC) 6.3 1.8 - 7.7 K/uL    Lymph # 2.5 1.0 - 4.8 K/uL    Mono # 0.6 0.3 - 1.0 K/uL    Eos # 0.2 0.0 - 0.5 K/uL    Baso # 0.03 0.00 - 0.20 K/uL    Gran% 66.2 38.0 - 73.0 %    Lymph% 25.8 18.0 - 48.0 %    Mono% 5.8 4.0 - 15.0 %    Eosinophil% 1.9 0.0 - 8.0 %    Basophil% 0.3 0.0 - 1.9 %    Differential Method Automated    Comprehensive metabolic panel   Result Value Ref Range    Sodium 137 136 - 145 mmol/L    Potassium 4.2 3.5 - 5.1 mmol/L    Chloride 100 95 - 110 mmol/L    CO2 26 23 - 29 mmol/L    Glucose 329 (H) 70 - 110 mg/dL    BUN, Bld 12 8 - 23 mg/dL    Creatinine 1.1 0.5 - 1.4 mg/dL    Calcium 9.3 8.7 - 10.5 mg/dL    Total Protein 7.1 6.0 - 8.4 g/dL    Albumin 3.3 (L) 3.5 - 5.2 g/dL    Total Bilirubin 0.4 0.1 - 1.0 mg/dL    Alkaline Phosphatase 87 55 - 135 U/L    AST 19 10 - 40 U/L    ALT 22 10 - 44 U/L    Anion Gap 11 8 - 16 mmol/L    eGFR if African American >60 >60 mL/min/1.73 m^2    eGFR if non African American 53 (A) >60 mL/min/1.73 m^2   Lactic acid, plasma #1   Result Value Ref Range    Lactate (Lactic Acid) 2.0 0.5 - 2.2 mmol/L   Urinalysis   Result Value Ref Range    Specimen UA Urine, Catheterized     Color, UA Yellow Yellow, Straw, Meera    Appearance, UA Cloudy (A) Clear    pH, UA 6.0 5.0 - 8.0    Specific Gravity, UA 1.025 1.005 - 1.030    Protein, UA 2+ (A) Negative    Glucose, UA 3+ (A) Negative    Ketones, UA 1+ (A) Negative    Bilirubin (UA) Negative Negative    Occult Blood UA 2+ (A) Negative    Nitrite, UA Positive (A) Negative    Urobilinogen, UA Negative <2.0 EU/dL    Leukocytes, UA 1+ (A) Negative   Troponin I   Result Value Ref Range    Troponin I 0.048 (H) 0.000 - 0.026 ng/mL   Procalcitonin   Result Value Ref Range    Procalcitonin 0.19 <0.25 ng/mL   Brain natriuretic peptide   Result Value Ref Range     (H) 0 - 99 pg/mL   PTH, intact   Result Value Ref  Range    PTH, Intact 71.6 9.0 - 77.0 pg/mL   Urinalysis Microscopic   Result Value Ref Range    RBC, UA 20 (H) 0 - 4 /hpf    WBC, UA >100 (H) 0 - 5 /hpf    Bacteria, UA Many (A) None-Occ /hpf    Yeast, UA None None    Hyaline Casts, UA 0 0-1/lpf /lpf    Microscopic Comment SEE COMMENT    POCT glucose   Result Value Ref Range    POCT Glucose 296 (H) 70 - 110 mg/dL      All pertinent labs within the past 24 hours have been reviewed.    Significant Imaging:   Imaging Results          X-Ray Chest 1 View for PICC_Central line (Final result)  Result time 07/22/18 20:32:12    Final result by Ward Brady MD (07/22/18 20:32:12)                 Impression:      Left PICC line in good position as above.      Electronically signed by: Ward Brady MD  Date:    07/22/2018  Time:    20:32             Narrative:    EXAMINATION:  XR CHEST 1 VIEW    CLINICAL HISTORY:  PICC line placement., Evaluate PICC line placement;    COMPARISON:  07/22/2018    FINDINGS:  Left PICC line has been placed and is in good position with the catheter tip projecting at the superior SVC level.  Otherwise no change.                               X-Ray Chest AP Portable (Final result)  Result time 07/22/18 18:14:05    Final result by Ward Brady MD (07/22/18 18:14:05)                 Impression:      Nonspecific cardiomediastinal silhouette enlargement.  Decreased lung volumes.  No acute infiltrate.      Electronically signed by: Ward Brady MD  Date:    07/22/2018  Time:    18:14             Narrative:    EXAMINATION:  XR CHEST AP PORTABLE    CLINICAL HISTORY:  Sepsis.  Respiratory distress., Sepsis;    COMPARISON:  None    FINDINGS:  Large patient with shallow inspiratory effort.  The cardiac silhouette is enlarged.  There is nonspecific widening of the cardiomediastinal silhouette more so on the right than left.  This may be secondary to epidural lipomatosis.    The lung volumes are decreased with elevation of the right  diaphragm.                               I have reviewed all pertinent imaging results/findings within the past 24 hours.     EKG: (personally reviewed)  Normal sinus rhythm, nonspecific ST-T abnormality.  No acute ischemic ST-T abnormalities.  No previous to compare.

## 2018-07-24 LAB
ANION GAP SERPL CALC-SCNC: 8 MMOL/L
BASOPHILS # BLD AUTO: 0.02 K/UL
BASOPHILS NFR BLD: 0.3 %
BUN SERPL-MCNC: 23 MG/DL
CALCIUM SERPL-MCNC: 8.3 MG/DL
CHLORIDE SERPL-SCNC: 101 MMOL/L
CO2 SERPL-SCNC: 28 MMOL/L
CREAT SERPL-MCNC: 1.3 MG/DL
DIASTOLIC DYSFUNCTION: NO
DIFFERENTIAL METHOD: ABNORMAL
EOSINOPHIL # BLD AUTO: 0.3 K/UL
EOSINOPHIL NFR BLD: 3.7 %
ERYTHROCYTE [DISTWIDTH] IN BLOOD BY AUTOMATED COUNT: 13.9 %
EST. GFR  (AFRICAN AMERICAN): 50 ML/MIN/1.73 M^2
EST. GFR  (NON AFRICAN AMERICAN): 43 ML/MIN/1.73 M^2
GLUCOSE SERPL-MCNC: 181 MG/DL
HCT VFR BLD AUTO: 36.4 %
HGB BLD-MCNC: 11.7 G/DL
INR PPP: 2.3
LYMPHOCYTES # BLD AUTO: 2.2 K/UL
LYMPHOCYTES NFR BLD: 27.9 %
MCH RBC QN AUTO: 27.5 PG
MCHC RBC AUTO-ENTMCNC: 32.1 G/DL
MCV RBC AUTO: 86 FL
MONOCYTES # BLD AUTO: 0.6 K/UL
MONOCYTES NFR BLD: 8.1 %
NEUTROPHILS # BLD AUTO: 4.8 K/UL
NEUTROPHILS NFR BLD: 60 %
PLATELET # BLD AUTO: 202 K/UL
PMV BLD AUTO: 11.4 FL
POCT GLUCOSE: 191 MG/DL (ref 70–110)
POCT GLUCOSE: 201 MG/DL (ref 70–110)
POCT GLUCOSE: 281 MG/DL (ref 70–110)
POCT GLUCOSE: 302 MG/DL (ref 70–110)
POTASSIUM SERPL-SCNC: 3.6 MMOL/L
PROTHROMBIN TIME: 24.2 SEC
RBC # BLD AUTO: 4.25 M/UL
SODIUM SERPL-SCNC: 137 MMOL/L
WBC # BLD AUTO: 7.91 K/UL

## 2018-07-24 PROCEDURE — 85025 COMPLETE CBC W/AUTO DIFF WBC: CPT

## 2018-07-24 PROCEDURE — 93016 CV STRESS TEST SUPVJ ONLY: CPT | Mod: ,,, | Performed by: INTERNAL MEDICINE

## 2018-07-24 PROCEDURE — 63600175 PHARM REV CODE 636 W HCPCS: Performed by: NURSE PRACTITIONER

## 2018-07-24 PROCEDURE — 97110 THERAPEUTIC EXERCISES: CPT

## 2018-07-24 PROCEDURE — 93018 CV STRESS TEST I&R ONLY: CPT | Mod: ,,, | Performed by: INTERNAL MEDICINE

## 2018-07-24 PROCEDURE — 96372 THER/PROPH/DIAG INJ SC/IM: CPT | Mod: 59

## 2018-07-24 PROCEDURE — 25000003 PHARM REV CODE 250: Performed by: HOSPITALIST

## 2018-07-24 PROCEDURE — 80048 BASIC METABOLIC PNL TOTAL CA: CPT

## 2018-07-24 PROCEDURE — 21400001 HC TELEMETRY ROOM

## 2018-07-24 PROCEDURE — 25000003 PHARM REV CODE 250: Performed by: NURSE PRACTITIONER

## 2018-07-24 PROCEDURE — 99900035 HC TECH TIME PER 15 MIN (STAT)

## 2018-07-24 PROCEDURE — G0378 HOSPITAL OBSERVATION PER HR: HCPCS

## 2018-07-24 PROCEDURE — 63600175 PHARM REV CODE 636 W HCPCS: Performed by: INTERNAL MEDICINE

## 2018-07-24 PROCEDURE — 78452 HT MUSCLE IMAGE SPECT MULT: CPT | Mod: 26,,, | Performed by: INTERNAL MEDICINE

## 2018-07-24 PROCEDURE — 25000003 PHARM REV CODE 250: Performed by: INTERNAL MEDICINE

## 2018-07-24 PROCEDURE — 63600175 PHARM REV CODE 636 W HCPCS: Performed by: PHYSICIAN ASSISTANT

## 2018-07-24 PROCEDURE — 93017 CV STRESS TEST TRACING ONLY: CPT

## 2018-07-24 PROCEDURE — 99232 SBSQ HOSP IP/OBS MODERATE 35: CPT | Mod: 25,,, | Performed by: INTERNAL MEDICINE

## 2018-07-24 PROCEDURE — 85610 PROTHROMBIN TIME: CPT

## 2018-07-24 PROCEDURE — 97530 THERAPEUTIC ACTIVITIES: CPT

## 2018-07-24 RX ORDER — REGADENOSON 0.08 MG/ML
0.4 INJECTION, SOLUTION INTRAVENOUS ONCE
Status: COMPLETED | OUTPATIENT
Start: 2018-07-24 | End: 2018-07-24

## 2018-07-24 RX ADMIN — REGADENOSON 0.4 MG: 0.08 INJECTION, SOLUTION INTRAVENOUS at 03:07

## 2018-07-24 RX ADMIN — GABAPENTIN 600 MG: 300 CAPSULE ORAL at 09:07

## 2018-07-24 RX ADMIN — OXYBUTYNIN CHLORIDE 10 MG: 5 TABLET, EXTENDED RELEASE ORAL at 08:07

## 2018-07-24 RX ADMIN — INSULIN ASPART 2 UNITS: 100 INJECTION, SOLUTION INTRAVENOUS; SUBCUTANEOUS at 12:07

## 2018-07-24 RX ADMIN — METOPROLOL TARTRATE 25 MG: 25 TABLET, FILM COATED ORAL at 08:07

## 2018-07-24 RX ADMIN — INSULIN ASPART 3 UNITS: 100 INJECTION, SOLUTION INTRAVENOUS; SUBCUTANEOUS at 05:07

## 2018-07-24 RX ADMIN — CEFTRIAXONE 1 G: 1 INJECTION, SOLUTION INTRAVENOUS at 11:07

## 2018-07-24 RX ADMIN — WARFARIN SODIUM 5 MG: 5 TABLET ORAL at 05:07

## 2018-07-24 RX ADMIN — INSULIN DETEMIR 58 UNITS: 100 INJECTION, SOLUTION SUBCUTANEOUS at 09:07

## 2018-07-24 RX ADMIN — HYDROCODONE BITARTRATE AND ACETAMINOPHEN 1 TABLET: 10; 325 TABLET ORAL at 08:07

## 2018-07-24 RX ADMIN — GABAPENTIN 600 MG: 300 CAPSULE ORAL at 08:07

## 2018-07-24 RX ADMIN — FUROSEMIDE 40 MG: 40 TABLET ORAL at 08:07

## 2018-07-24 RX ADMIN — ENALAPRIL MALEATE 5 MG: 5 TABLET ORAL at 08:07

## 2018-07-24 RX ADMIN — PANTOPRAZOLE SODIUM 40 MG: 40 TABLET, DELAYED RELEASE ORAL at 08:07

## 2018-07-24 RX ADMIN — ASPIRIN 81 MG: 81 TABLET, COATED ORAL at 08:07

## 2018-07-24 RX ADMIN — METOPROLOL TARTRATE 25 MG: 25 TABLET, FILM COATED ORAL at 09:07

## 2018-07-24 RX ADMIN — ATORVASTATIN CALCIUM 40 MG: 40 TABLET, FILM COATED ORAL at 09:07

## 2018-07-24 RX ADMIN — HYDROCODONE BITARTRATE AND ACETAMINOPHEN 1 TABLET: 10; 325 TABLET ORAL at 09:07

## 2018-07-24 RX ADMIN — AMLODIPINE BESYLATE 5 MG: 5 TABLET ORAL at 08:07

## 2018-07-24 RX ADMIN — LEVOTHYROXINE SODIUM 75 MCG: 25 TABLET ORAL at 06:07

## 2018-07-24 RX ADMIN — INSULIN ASPART 2 UNITS: 100 INJECTION, SOLUTION INTRAVENOUS; SUBCUTANEOUS at 11:07

## 2018-07-24 RX ADMIN — VANCOMYCIN HYDROCHLORIDE 1000 MG: 1 INJECTION, POWDER, LYOPHILIZED, FOR SOLUTION INTRAVENOUS at 09:07

## 2018-07-24 NOTE — PLAN OF CARE
Problem: Patient Care Overview  Goal: Plan of Care Review  Outcome: Ongoing (interventions implemented as appropriate)  Blood glucose monitored, cardiac monitored NSR 70's, Stephenson in place. IV abx given per MD order. Pt in sizewise bed. Pt turned q2hr. Pt remains free of injury, pain managed adequately, no s/s of distress. 24 hour chart check completed. Will continue to monitor.

## 2018-07-24 NOTE — PROGRESS NOTES
"[facility] Hospital Medicine  Progress Note    Patient Name: Daiana Bear  MRN: [id number]  Patient Class: IP- Inpatient   Admission Date: 7/22/2018  Length of Stay: 1 days  Attending Physician: Victoriano Porras MD  Primary Care Provider: Suzanne Henson DO        Subjective:     Principal Problem:UTI (urinary tract infection)    HPI:  History obtained from patient, who is not the best historian.  Ms. Bear is a 61yo female  with a PMHx of CAD with remote PCI of unknown vessel, uncontrolled DM II, HTN, HLD, PVD with remote left AKA, OA, h/o gastric bypass, and h/o provoked DVT x 2.  She presented to the ED with c/o generalized weakness and fatigue that has progressively worsened over the past few weeks.  Associated "blister" to right lower ABD fold, chronic back pain, and chronic right knee pain.  No aggravating or alleviating factors.  Denies any CP, palpitations, SOB, STAFFORD, orthopnea, PND, cough, edema, weight gain, ABD pain or distention, N/V/D, constipation, dysuria, urinary frequency or urgency, hematuria, flank pain, lightheadedness/dizziness, syncope, HA, AMS, focal deficits, fever, chills, recent falls or injury.  Patient reports she is unable to care for herself, therefore, her brother is living with her as a caregiver.  Unfortunately, patient states her brother is unable to properly care for her or prepare meals.  She states she has not eaten anything in 2 days, and does not remember the last time she was bathed.  She admits to being noncompliant with multiple medications, including metoprolol, enalapril, amlodipine, HCTZ, Lasix, Synthroid, and insulin.  She states she never misses her Coumadin.  Work-up in ED resulted INR 2, glucose 329, UA consistent with UTI.  CXR showed nonspecific cardiomediastinal silhouette enlargement, decreased lung volumes, no acute infiltrate.  Right knee XR showed no acute findings with moderate to severe primary osteoarthritis.  Troponin was ordered in ED " for unknown reason, patient denies any angina.  Troponin resulted 0.048, , EKG unrevealing.  Hospital Medicine was called to admit for elevated troponin and panniculitis.  Currently, patient appears comfortable in NAD.  Denies any CP or SOB.    Hospital Course:  No notes on file    Interval History:  Right hip soft tissue pain.    Review of Systems   Constitutional: Negative for chills and fever.   HENT: Negative for congestion and sore throat.    Eyes: Negative for visual disturbance.   Respiratory: Negative for cough, shortness of breath and wheezing.    Cardiovascular: Negative for chest pain, palpitations and leg swelling.   Gastrointestinal: Negative for abdominal pain, blood in stool, constipation, diarrhea, nausea and vomiting.   Genitourinary: Negative for dysuria and hematuria.   Musculoskeletal: Positive for arthralgias and myalgias. Negative for back pain.   Skin: Negative for rash and wound.   Neurological: Negative for dizziness, weakness, light-headedness and numbness.   Hematological: Negative for adenopathy.     Objective:     Vital Signs (Most Recent):  Temp: 98.9 °F (37.2 °C) (07/23/18 2015)  Pulse: 78 (07/23/18 2015)  Resp: 17 (07/23/18 2015)  BP: (!) 97/56 (07/23/18 2015)  SpO2: (!) 93 % (07/23/18 2015) Vital Signs (24h Range):  Temp:  [97.7 °F (36.5 °C)-98.9 °F (37.2 °C)] 98.9 °F (37.2 °C)  Pulse:  [70-88] 78  Resp:  [17-18] 17  SpO2:  [92 %-95 %] 93 %  BP: ()/(46-88) 97/56     Weight: 89.1 kg (196 lb 8 oz)  Body mass index is 38.38 kg/m².    Intake/Output Summary (Last 24 hours) at 07/23/18 2224  Last data filed at 07/23/18 1800   Gross per 24 hour   Intake           1287.5 ml   Output             2025 ml   Net           -737.5 ml      Physical Exam   Constitutional: She is oriented to person, place, and time. She appears well-developed and well-nourished. No distress.   HENT:   Head: Normocephalic and atraumatic.   Mouth/Throat: Oropharynx is clear and moist.   Eyes: Conjunctivae  and EOM are normal. Pupils are equal, round, and reactive to light.   Neck: Neck supple. No JVD present. No thyromegaly present.   Cardiovascular: Normal rate and regular rhythm.  Exam reveals no gallop and no friction rub.    No murmur heard.  Pulmonary/Chest: Effort normal and breath sounds normal. She has no wheezes. She has no rales.   Abdominal: Soft. Bowel sounds are normal. She exhibits no distension. There is no tenderness. There is no rebound and no guarding.   Musculoskeletal: Normal range of motion. She exhibits no edema or deformity.   Right hip and pelvic pain   Lymphadenopathy:     She has no cervical adenopathy.   Neurological: She is alert and oriented to person, place, and time. She has normal reflexes.   Skin: Skin is warm and dry. No rash noted.   1 cm eschar with surrounding erythema right lower abdomen   Psychiatric: She has a normal mood and affect. Her behavior is normal. Judgment and thought content normal.   Nursing note and vitals reviewed.      Significant Labs: All pertinent labs within the past 24 hours have been reviewed.    Significant Imaging: I have reviewed all pertinent imaging results/findings within the past 24 hours.    Assessment/Plan:      * UTI (urinary tract infection)    - Urine culture pending, follow sensitivities.   - Empiric IV Rocephin.        Noncompliance with medication regimen    - Counseled on importance of compliance.        Cellulitis of right lower extremity without abscess    - Empiric IV Vanc, pharmacy consult for dosing.  - PRN analgesics.  - Wound Care consult in AM.        Debility    - PT/OT consult to eval and treat.  - Social Work consult for DC planning, will need placement.        Elevated troponin with CAD h/o PCI in 2006    - Patient denies any current or recent angina/anginal equivalent.   - Initial troponin 0.048, EKG unrevealing.  Follow serial results.   - Check CPK, FLP.  - TTE in AM.  - ASA, BB, and statin.  - Patient followed by Dr. Velázquez  "([facility]).  Last f/u 2/2017 with "normal" MPI and echo, per patient.        Uncontrolled type 2 diabetes with peripheral autonomic neuropathy    - Hold metformin, patient no longer taking.  - Continue basal insulin 58 units nightly.  - Accuchecks with SSI.  - Diabetic diet.  - Check HbA1c.        History of DVT on Coumadin therapy    - Therapeutic INR of 2.  Continue Coumadin with daily INR, pharmacy consult for dosing.        Essential hypertension    - BP stable but above goal due to medication noncompliance.  - Continue enalapril, amlodipine, and metoprolol.  Resume PO Lasix.  Monitor BP trends and titrate as needed.        Acute panniculitis    - Likely infective.  - Empiric IV Vanc, pharmacy consult for dosing.  - Wound Care consult in AM.          VTE Risk Mitigation         Ordered     warfarin (COUMADIN) tablet 5 mg  Daily      07/22/18 2137     Place sequential compression device  Until discontinued      07/22/18 2130              Victoriano Porras MD  Department of Hospital Medicine   [facility]  "

## 2018-07-24 NOTE — SUBJECTIVE & OBJECTIVE
Review of Systems   Constitution: Negative.   HENT: Negative.    Eyes: Negative.    Cardiovascular: Negative.    Respiratory: Negative.    Endocrine: Negative.    Hematologic/Lymphatic: Negative.    Skin: Negative.    Musculoskeletal: Negative.    Gastrointestinal: Negative.    Genitourinary: Negative.    Neurological: Negative.    Psychiatric/Behavioral: Negative.    Allergic/Immunologic: Negative.      Objective:     Vital Signs (Most Recent):  Temp:  (pt. was gone) (07/24/18 1208)  Pulse: 71 (07/24/18 0802)  Resp: 20 (07/24/18 0802)  BP: 121/64 (07/24/18 0802)  SpO2: 95 % (07/24/18 0802) Vital Signs (24h Range):  Temp:  [98.1 °F (36.7 °C)-98.9 °F (37.2 °C)] 98.2 °F (36.8 °C)  Pulse:  [70-78] 71  Resp:  [17-22] 20  SpO2:  [92 %-95 %] 95 %  BP: ()/(50-65) 121/64     Weight: 89.1 kg (196 lb 8 oz)  Body mass index is 38.38 kg/m².     SpO2: 95 %  O2 Device (Oxygen Therapy): room air      Intake/Output Summary (Last 24 hours) at 07/24/18 1249  Last data filed at 07/24/18 1000   Gross per 24 hour   Intake              480 ml   Output              150 ml   Net              330 ml       Lines/Drains/Airways     Peripherally Inserted Central Catheter Line                 PICC Double Lumen 07/22/18 1950 left basilic 1 day          Drain                 Urethral Catheter 07/22/18 2230 Non-latex 1 day          Pressure Ulcer                 Pressure Injury 07/22/18 2045   Buttocks Stage 1 1 day         Pressure Injury 07/22/18 2045 Coccyx Deep tissue injury 1 day                Physical Exam   Constitutional: She is oriented to person, place, and time. She appears well-developed and well-nourished. No distress.   HENT:   Head: Normocephalic and atraumatic.   Eyes: Pupils are equal, round, and reactive to light. Right eye exhibits no discharge. Left eye exhibits no discharge.   Neck: Neck supple. No JVD present. No thyromegaly present.   Cardiovascular: Normal rate, regular rhythm, S1 normal, S2 normal and normal  heart sounds.    No murmur heard.  Pulmonary/Chest: Effort normal and breath sounds normal. No respiratory distress. She has no wheezes. She has no rales.   Abdominal: Soft. She exhibits no distension. There is no rebound.   Musculoskeletal: She exhibits no edema.   Neurological: She is alert and oriented to person, place, and time.   Skin: Skin is warm and dry. She is not diaphoretic. There is erythema (improved to RLE).   L AKA    Psychiatric: She has a normal mood and affect. Her behavior is normal. Thought content normal.   Nursing note and vitals reviewed.      Significant Labs:   CMP   Recent Labs  Lab 07/22/18  1806 07/23/18  0615 07/24/18  0650    137 137   K 4.2 3.8 3.6    99 101   CO2 26 27 28   * 331* 181*   BUN 12 15 23   CREATININE 1.1 1.2 1.3   CALCIUM 9.3 8.6* 8.3*   PROT 7.1  --   --    ALBUMIN 3.3*  --   --    BILITOT 0.4  --   --    ALKPHOS 87  --   --    AST 19  --   --    ALT 22  --   --    ANIONGAP 11 11 8   ESTGFRAFRICA >60 55* 50*   EGFRNONAA 53* 48* 43*   , CBC   Recent Labs  Lab 07/22/18  1806 07/23/18  0615 07/24/18  0650   WBC 9.51 7.92 7.91   HGB 13.9 12.7 11.7*   HCT 42.2 39.1 36.4*    219 202   , Troponin   Recent Labs  Lab 07/23/18  0040 07/23/18  0615 07/23/18  0800   TROPONINI 0.057* 0.042* 0.046*    and All pertinent lab results from the last 24 hours have been reviewed.    Significant Imaging: Echocardiogram:   2D echo with color flow doppler:   Results for orders placed or performed during the hospital encounter of 07/22/18   2D echo with color flow doppler   Result Value Ref Range    EF 50 55 - 65    Mitral Valve Regurgitation MILD     Diastolic Dysfunction Yes (A)     Est. PA Systolic Pressure 21.16    , EKG: Reviewed and X-Ray: CXR: X-Ray Chest 1 View (CXR):   Results for orders placed or performed during the hospital encounter of 07/22/18   X-Ray Chest 1 View for PICC_Central line    Narrative    EXAMINATION:  XR CHEST 1 VIEW    CLINICAL  HISTORY:  PICC line placement., Evaluate PICC line placement;    COMPARISON:  07/22/2018    FINDINGS:  Left PICC line has been placed and is in good position with the catheter tip projecting at the superior SVC level.  Otherwise no change.      Impression    Left PICC line in good position as above.      Electronically signed by: Ward Brady MD  Date:    07/22/2018  Time:    20:32

## 2018-07-24 NOTE — PROGRESS NOTES
Clinical Pharmacy Progress Note: Coumadin Dosing and Monitoring     Goal INR: 2-3  Indication: History of DVT x 2   Lab Results   Component Value Date    INR 2.3 (H) 07/24/2018    INR 2.3 (H) 07/23/2018    INR 2.0 (H) 07/22/2018     Plan: Patient has not yet been educated by pharmacist this admission. Will attempt today.      Current dose: Patient's home dose is currently unclear. Per [facility] she has filled warfarin 4 mg most recently but appears to only fill it once every two months instead of monthly. INR increase of 0.3 indicates we may have a higher jump in INR tomorrow; will proceed cautiously. Will attempt to clarify home dose at patient education. Per admission note patient is poor historian  PT/INR will be monitored daily. Dose adjustments will be made accordingly.      Thank you for allowing us to participate in this patient's care.    Jayla Almanza, PharmD 7/24/2018 1:50 PM

## 2018-07-24 NOTE — PLAN OF CARE
Problem: Patient Care Overview  Goal: Plan of Care Review  Outcome: Ongoing (interventions implemented as appropriate)  Fall precautions maintained. Pt free from falls/injuries. Pt repositions with assistance x2. Pt has occasional c/o pain. Pain moderatley controlled with PRN medication. Diabetic diet maintained and tolerated. Glycemic control provided with Q6 glucose monitoring. Fluid promotion with PO fluids. Specialty bed in use. Patient is turned every 2 hours. POC and meds reviewed. Patient verbalized understanding. Side rails up x2. Bed Low and locked. Personal items and call light within reach. No signs/symptoms of acute distress. Chart check done. Will monitor

## 2018-07-24 NOTE — PLAN OF CARE
Problem: Physical Therapy Goal  Goal: Physical Therapy Goal  LTGs to be met within 7 days (7/30/18):  1.  Patient will perform bed mobility with SBA  2.  Patient will participate in functional transfer assessment  3.  Patient will perform BLE therapeutic exercises 10 x 2 in all available planes  4.  Patient will demo Good static/dynamic sitting balance   Outcome: Ongoing (interventions implemented as appropriate)  PT REQUIRES MAX A TO SIT EOB

## 2018-07-24 NOTE — ASSESSMENT & PLAN NOTE
-Troponin elevated but flat, 0.048>0.057>0.042  -Suspect related to demand ischemia from underlying UTI/panniculitis  -Denies chest pain/SOB/anginal symptoms  -2D echo showed EF of 50-55%  -Continue home medications-ASA, Plavix, ACEI, statin  -Counseled on medication compliance  -Stress test today  -Further rec's to follow

## 2018-07-24 NOTE — HOSPITAL COURSE
7/24/18-Patient seen and examined today. Uneventful night. No chest pain or SOB. Stress test planned for today. 2D echo showed normal EF.

## 2018-07-24 NOTE — PT/OT/SLP PROGRESS
Physical Therapy  Treatment    Daiana Bear   MRN: [id number]   Admitting Diagnosis: UTI (urinary tract infection)    PT Received On: 07/24/18  PT Start Time: 0916     PT Stop Time: 0940    PT Total Time (min): 24 min       Billable Minutes:  Therapeutic Activity 14 and Therapeutic Exercise 10    Treatment Type: Treatment  PT/PTA: PT             General Precautions: Standard, fall  Orthopedic Precautions: N/A   Braces: N/A    Do you have any cultural, spiritual, Taoist conflicts, given your current situation?: no    Subjective:  Communicated with NURSE CHAPMAN AND [facility] CHART REVIEW  prior to session.  PT AGREED TO TX     Pain/Comfort  Pain Rating 1: 10/10  Location 1:  (ALL OVER)    Objective:   Patient found with: garcia catheter, telemetry, peripheral IV    Functional Mobility:  PT SUP>SIT EOB WITH HOB ELEVATED AND MAX A. PT SCOOTED TO EOB WITH MAX A. PT COMPLETED SEATED MIP, R LE TKE, AND AP. PT SEATED WITH PILLOW SUPPORT. PT COMPLETED B SHOULDER AND ELBOW FLEX /EXT X 10 REPS. PT WITH LIMITED ROM WITH ALL TE. PT LEFT SEATED AND SET UP WITH BREAKFAST TRAY AND NURSE AWARE TO ASSIST TO BED WITH READY.    AM-PAC 6 CLICK MOBILITY  How much help from another person does this patient currently need?   1 = Unable, Total/Dependent Assistance  2 = A lot, Maximum/Moderate Assistance  3 = A little, Minimum/Contact Guard/Supervision  4 = None, Modified [place]/Independent    Turning over in bed (including adjusting bedclothes, sheets and blankets)?: 2  Sitting down on and standing up from a chair with arms (e.g., wheelchair, bedside commode, etc.): 1  Moving from lying on back to sitting on the side of the bed?: 2  Moving to and from a bed to a chair (including a wheelchair)?: 1  Need to walk in hospital room?: 1  Climbing 3-5 steps with a railing?: 1  Basic Mobility Total Score: 8    AM-PAC Raw Score CMS G-Code Modifier Level of Impairment Assistance   6 % Total / Unable   7 - 9 CM 80 - 100% Maximal Assist    10 - 14 CL 60 - 80% Moderate Assist   15 - 19 CK 40 - 60% Moderate Assist   20 - 22 CJ 20 - 40% Minimal Assist   23 CI 1-20% SBA / CGA   24 CH 0% Independent/ Mod I     Patient left SEATED EOB with call button in reach and NURSE ARI notified.    Assessment:  PT SAM MIN TX. PT CONT TO BE VERY LIMITED WITH GROSS FUNC MOBILITY AND WILL CONT TO BENEFIT FROM P.T.     Rehab identified problem list/impairments: Rehab identified problem list/impairments: weakness, impaired endurance, impaired balance, decreased lower extremity function, decreased upper extremity function, pain, impaired functional mobilty, impaired muscle length, impaired self care skills    Rehab potential is good.    Activity tolerance: Poor    Discharge recommendations: Discharge Facility/Level Of Care Needs: nursing facility, skilled     Barriers to discharge:      Equipment recommendations: Equipment Needed After Discharge: none     GOALS:    Physical Therapy Goals        Problem: Physical Therapy Goal    Goal Priority Disciplines Outcome Goal Variances Interventions   Physical Therapy Goal     PT/OT, PT Ongoing (interventions implemented as appropriate)     Description:  LTGs to be met within 7 days (7/30/18):  1.  Patient will perform bed mobility with SBA  2.  Patient will participate in functional transfer assessment  3.  Patient will perform BLE therapeutic exercises 10 x 2 in all available planes  4.  Patient will demo Good static/dynamic sitting balance                    PLAN:    Patient to be seen 5 x/week  to address the above listed problems via gait training, therapeutic activities, therapeutic exercises  Plan of Care expires: 07/30/18  Plan of Care reviewed with: patient         Joyce Romeo, PT  07/24/2018

## 2018-07-24 NOTE — NURSING
Spoke with Dr. Porras. Patient and family requesting to keep garcia through the night. Patient aware of infection risks. Dr. Porras states it is fine for tonight and to remove in AM. Will make PM nurse aware. Patient aware of removal in AM.

## 2018-07-24 NOTE — SUBJECTIVE & OBJECTIVE
Interval History:  Right hip soft tissue pain.    Review of Systems   Constitutional: Negative for chills and fever.   HENT: Negative for congestion and sore throat.    Eyes: Negative for visual disturbance.   Respiratory: Negative for cough, shortness of breath and wheezing.    Cardiovascular: Negative for chest pain, palpitations and leg swelling.   Gastrointestinal: Negative for abdominal pain, blood in stool, constipation, diarrhea, nausea and vomiting.   Genitourinary: Negative for dysuria and hematuria.   Musculoskeletal: Positive for arthralgias and myalgias. Negative for back pain.   Skin: Negative for rash and wound.   Neurological: Negative for dizziness, weakness, light-headedness and numbness.   Hematological: Negative for adenopathy.     Objective:     Vital Signs (Most Recent):  Temp: 98.9 °F (37.2 °C) (07/23/18 2015)  Pulse: 78 (07/23/18 2015)  Resp: 17 (07/23/18 2015)  BP: (!) 97/56 (07/23/18 2015)  SpO2: (!) 93 % (07/23/18 2015) Vital Signs (24h Range):  Temp:  [97.7 °F (36.5 °C)-98.9 °F (37.2 °C)] 98.9 °F (37.2 °C)  Pulse:  [70-88] 78  Resp:  [17-18] 17  SpO2:  [92 %-95 %] 93 %  BP: ()/(46-88) 97/56     Weight: 89.1 kg (196 lb 8 oz)  Body mass index is 38.38 kg/m².    Intake/Output Summary (Last 24 hours) at 07/23/18 2224  Last data filed at 07/23/18 1800   Gross per 24 hour   Intake           1287.5 ml   Output             2025 ml   Net           -737.5 ml      Physical Exam   Constitutional: She is oriented to person, place, and time. She appears well-developed and well-nourished. No distress.   HENT:   Head: Normocephalic and atraumatic.   Mouth/Throat: Oropharynx is clear and moist.   Eyes: Conjunctivae and EOM are normal. Pupils are equal, round, and reactive to light.   Neck: Neck supple. No JVD present. No thyromegaly present.   Cardiovascular: Normal rate and regular rhythm.  Exam reveals no gallop and no friction rub.    No murmur heard.  Pulmonary/Chest: Effort normal and breath  sounds normal. She has no wheezes. She has no rales.   Abdominal: Soft. Bowel sounds are normal. She exhibits no distension. There is no tenderness. There is no rebound and no guarding.   Musculoskeletal: Normal range of motion. She exhibits no edema or deformity.   Right hip and pelvic pain   Lymphadenopathy:     She has no cervical adenopathy.   Neurological: She is alert and oriented to person, place, and time. She has normal reflexes.   Skin: Skin is warm and dry. No rash noted.   1 cm eschar with surrounding erythema right lower abdomen   Psychiatric: She has a normal mood and affect. Her behavior is normal. Judgment and thought content normal.   Nursing note and vitals reviewed.      Significant Labs: All pertinent labs within the past 24 hours have been reviewed.    Significant Imaging: I have reviewed all pertinent imaging results/findings within the past 24 hours.

## 2018-07-24 NOTE — PROGRESS NOTES
"[facility]  Cardiology  Progress Note    Patient Name: Daiana Bear  MRN: [id number]  Admission Date: 7/22/2018  Hospital Length of Stay: 2 days  Code Status: Full Code   Attending Physician: Victoriano Porras MD   Primary Care Physician: Suzanne Henson DO  Expected Discharge Date:   Principal Problem:UTI (urinary tract infection)    Subjective:   HPI:  Ms. Bear is a 62 year old female patient with a PMHx of CAD s/p PCI in 2006, DM type II, HTN, hyperlipidemia, PVD s/p L AKA, OA, gastric bypass, and DVT x 2 (on Coumadin) who presented to [facility] ED yesterday with a chief complaint of fatigue and weakness over the past few weeks, with recent worsening over the past two days. Associated symptoms included an abdominal "blister/sore" and right knee pain. Patient denied any associated fever, chills, SOB, nausea, vomiting, palpitations, chest pain, near syncope, or syncope. Initial workup in ED revealed +UTI and troponin of 0.048 and patient subsequently admitted for further evaluation and treatment. Cardiology consulted due to elevated cardiac enzymes. Patient seen and examined today, lying in bed. Still feels weak, but improved. No cardiac complaints. Remains chest pain free. No increased  SOB. Followed as an OP by Dr. Berry with reported "normal" 2D echo and stress test last year. Admits she is not always compliant with her medications, with exception of Coumadin. Chart reviewed. Troponin 0.048>0.057>0.042. EKG reviewed, no acute ischemic changes appreciated. 2D echo pending.     Hospital Course:   7/24/18-Patient seen and examined today. Uneventful night. No chest pain or SOB. Stress test planned for today. 2D echo showed EF of 50-55%.      Review of Systems   Constitution: Negative.   HENT: Negative.    Eyes: Negative.    Cardiovascular: Negative.    Respiratory: Negative.    Endocrine: Negative.    Hematologic/Lymphatic: Negative.    Skin: Negative.    Musculoskeletal: Negative.  "   Gastrointestinal: Negative.    Genitourinary: Negative.    Neurological: Negative.    Psychiatric/Behavioral: Negative.    Allergic/Immunologic: Negative.      Objective:     Vital Signs (Most Recent):  Temp:  (pt. was gone) (07/24/18 1208)  Pulse: 71 (07/24/18 0802)  Resp: 20 (07/24/18 0802)  BP: 121/64 (07/24/18 0802)  SpO2: 95 % (07/24/18 0802) Vital Signs (24h Range):  Temp:  [98.1 °F (36.7 °C)-98.9 °F (37.2 °C)] 98.2 °F (36.8 °C)  Pulse:  [70-78] 71  Resp:  [17-22] 20  SpO2:  [92 %-95 %] 95 %  BP: ()/(50-65) 121/64     Weight: 89.1 kg (196 lb 8 oz)  Body mass index is 38.38 kg/m².     SpO2: 95 %  O2 Device (Oxygen Therapy): room air      Intake/Output Summary (Last 24 hours) at 07/24/18 1249  Last data filed at 07/24/18 1000   Gross per 24 hour   Intake              480 ml   Output              150 ml   Net              330 ml       Lines/Drains/Airways     Peripherally Inserted Central Catheter Line                 PICC Double Lumen 07/22/18 1950 left basilic 1 day          Drain                 Urethral Catheter 07/22/18 2230 Non-latex 1 day          Pressure Ulcer                 Pressure Injury 07/22/18 2045   Buttocks Stage 1 1 day         Pressure Injury 07/22/18 2045 Coccyx Deep tissue injury 1 day                Physical Exam   Constitutional: She is oriented to person, place, and time. She appears well-developed and well-nourished. No distress.   HENT:   Head: Normocephalic and atraumatic.   Eyes: Pupils are equal, round, and reactive to light. Right eye exhibits no discharge. Left eye exhibits no discharge.   Neck: Neck supple. No JVD present. No thyromegaly present.   Cardiovascular: Normal rate, regular rhythm, S1 normal, S2 normal and normal heart sounds.    No murmur heard.  Pulmonary/Chest: Effort normal and breath sounds normal. No respiratory distress. She has no wheezes. She has no rales.   Abdominal: Soft. She exhibits no distension. There is no rebound.   Musculoskeletal: She  exhibits no edema.   Neurological: She is alert and oriented to person, place, and time.   Skin: Skin is warm and dry. She is not diaphoretic. There is erythema (improved to RLE).   L AKA    Psychiatric: She has a normal mood and affect. Her behavior is normal. Thought content normal.   Nursing note and vitals reviewed.      Significant Labs:   CMP   Recent Labs  Lab 07/22/18  1806 07/23/18  0615 07/24/18  0650    137 137   K 4.2 3.8 3.6    99 101   CO2 26 27 28   * 331* 181*   BUN 12 15 23   CREATININE 1.1 1.2 1.3   CALCIUM 9.3 8.6* 8.3*   PROT 7.1  --   --    ALBUMIN 3.3*  --   --    BILITOT 0.4  --   --    ALKPHOS 87  --   --    AST 19  --   --    ALT 22  --   --    ANIONGAP 11 11 8   ESTGFRAFRICA >60 55* 50*   EGFRNONAA 53* 48* 43*   , CBC   Recent Labs  Lab 07/22/18  1806 07/23/18  0615 07/24/18  0650   WBC 9.51 7.92 7.91   HGB 13.9 12.7 11.7*   HCT 42.2 39.1 36.4*    219 202   , Troponin   Recent Labs  Lab 07/23/18  0040 07/23/18  0615 07/23/18  0800   TROPONINI 0.057* 0.042* 0.046*    and All pertinent lab results from the last 24 hours have been reviewed.    Significant Imaging: Echocardiogram:   2D echo with color flow doppler:   Results for orders placed or performed during the hospital encounter of 07/22/18   2D echo with color flow doppler   Result Value Ref Range    EF 50 55 - 65    Mitral Valve Regurgitation MILD     Diastolic Dysfunction Yes (A)     Est. PA Systolic Pressure 21.16    , EKG: Reviewed and X-Ray: CXR: X-Ray Chest 1 View (CXR):   Results for orders placed or performed during the hospital encounter of 07/22/18   X-Ray Chest 1 View for PICC_Central line    Narrative    EXAMINATION:  XR CHEST 1 VIEW    CLINICAL HISTORY:  PICC line placement., Evaluate PICC line placement;    COMPARISON:  07/22/2018    FINDINGS:  Left PICC line has been placed and is in good position with the catheter tip projecting at the superior SVC level.  Otherwise no change.      Impression     Left PICC line in good position as above.      Electronically signed by: Ward Brady MD  Date:    07/22/2018  Time:    20:32     Assessment and Plan:   Patient who presents with elevated troponin in setting of UTI/infection. No anginal symptoms. 2D echo showed EF of 50-55%. Continue current meds. Stress test today, further rec's to follow.    * UTI (urinary tract infection)    -Mgmt as per hospital medicine        Cellulitis of right lower extremity without abscess    -Mgmt as per hospital medicine        Elevated troponin with CAD h/o PCI in 2006    -Troponin elevated but flat, 0.048>0.057>0.042  -Suspect related to demand ischemia from underlying UTI/panniculitis  -Denies chest pain/SOB/anginal symptoms  -2D echo showed EF of 50-55%  -Continue home medications-ASA, Plavix, ACEI, statin  -Counseled on medication compliance  -Stress test today  -Further rec's to follow        History of DVT on Coumadin therapy    -Continue Coumadin           Essential hypertension    -Continue amlodipine, Enalapril, Lopressor        Acute panniculitis    -Mgmt as per hospital medicine, on abx            VTE Risk Mitigation         Ordered     warfarin (COUMADIN) tablet 5 mg  Daily      07/22/18 2137     Place sequential compression device  Until discontinued      07/22/18 2130          Megan Sneed PA-C  Cardiology  [facility]     Chart reviewed. Dr. Edgar examined patient and agrees with plan as outlined above.

## 2018-07-25 PROBLEM — M79.3 ACUTE PANNICULITIS: Status: ACTIVE | Noted: 2018-07-25

## 2018-07-25 LAB
1,25(OH)2D3 SERPL-MCNC: 22 PG/ML
ANION GAP SERPL CALC-SCNC: 8 MMOL/L
BASOPHILS # BLD AUTO: 0.02 K/UL
BASOPHILS NFR BLD: 0.3 %
BUN SERPL-MCNC: 28 MG/DL
CALCIUM SERPL-MCNC: 8.5 MG/DL
CHLORIDE SERPL-SCNC: 101 MMOL/L
CO2 SERPL-SCNC: 28 MMOL/L
CREAT SERPL-MCNC: 1.3 MG/DL
DIFFERENTIAL METHOD: ABNORMAL
EOSINOPHIL # BLD AUTO: 0.3 K/UL
EOSINOPHIL NFR BLD: 4.5 %
ERYTHROCYTE [DISTWIDTH] IN BLOOD BY AUTOMATED COUNT: 14 %
EST. GFR  (AFRICAN AMERICAN): 50 ML/MIN/1.73 M^2
EST. GFR  (NON AFRICAN AMERICAN): 43 ML/MIN/1.73 M^2
GLUCOSE SERPL-MCNC: 235 MG/DL
HCT VFR BLD AUTO: 36.2 %
HGB BLD-MCNC: 11.5 G/DL
INR PPP: 2.4
LYMPHOCYTES # BLD AUTO: 2.6 K/UL
LYMPHOCYTES NFR BLD: 38 %
MCH RBC QN AUTO: 27.5 PG
MCHC RBC AUTO-ENTMCNC: 31.8 G/DL
MCV RBC AUTO: 87 FL
MONOCYTES # BLD AUTO: 0.6 K/UL
MONOCYTES NFR BLD: 8.1 %
NEUTROPHILS # BLD AUTO: 3.4 K/UL
NEUTROPHILS NFR BLD: 49.1 %
PLATELET # BLD AUTO: 198 K/UL
PMV BLD AUTO: 11 FL
POCT GLUCOSE: 224 MG/DL (ref 70–110)
POCT GLUCOSE: 245 MG/DL (ref 70–110)
POCT GLUCOSE: 267 MG/DL (ref 70–110)
POCT GLUCOSE: 290 MG/DL (ref 70–110)
POTASSIUM SERPL-SCNC: 3.9 MMOL/L
PROTHROMBIN TIME: 24.8 SEC
RBC # BLD AUTO: 4.18 M/UL
SODIUM SERPL-SCNC: 137 MMOL/L
WBC # BLD AUTO: 6.94 K/UL

## 2018-07-25 PROCEDURE — 80048 BASIC METABOLIC PNL TOTAL CA: CPT

## 2018-07-25 PROCEDURE — 25000003 PHARM REV CODE 250: Performed by: NURSE PRACTITIONER

## 2018-07-25 PROCEDURE — 85610 PROTHROMBIN TIME: CPT

## 2018-07-25 PROCEDURE — 97535 SELF CARE MNGMENT TRAINING: CPT | Mod: 59

## 2018-07-25 PROCEDURE — 25000003 PHARM REV CODE 250: Performed by: HOSPITALIST

## 2018-07-25 PROCEDURE — 97530 THERAPEUTIC ACTIVITIES: CPT

## 2018-07-25 PROCEDURE — 63600175 PHARM REV CODE 636 W HCPCS: Performed by: NURSE PRACTITIONER

## 2018-07-25 PROCEDURE — G0378 HOSPITAL OBSERVATION PER HR: HCPCS

## 2018-07-25 PROCEDURE — 85025 COMPLETE CBC W/AUTO DIFF WBC: CPT

## 2018-07-25 PROCEDURE — 97110 THERAPEUTIC EXERCISES: CPT

## 2018-07-25 RX ORDER — FUROSEMIDE 20 MG/1
20 TABLET ORAL DAILY
Status: DISCONTINUED | OUTPATIENT
Start: 2018-07-26 | End: 2018-07-26 | Stop reason: HOSPADM

## 2018-07-25 RX ORDER — MICONAZOLE NITRATE 2 %
POWDER (GRAM) TOPICAL 2 TIMES DAILY
Status: DISCONTINUED | OUTPATIENT
Start: 2018-07-25 | End: 2018-07-26 | Stop reason: HOSPADM

## 2018-07-25 RX ORDER — DOXYCYCLINE HYCLATE 100 MG
100 TABLET ORAL EVERY 12 HOURS
Status: DISCONTINUED | OUTPATIENT
Start: 2018-07-25 | End: 2018-07-26 | Stop reason: HOSPADM

## 2018-07-25 RX ORDER — ENALAPRIL MALEATE 2.5 MG/1
2.5 TABLET ORAL DAILY
Status: DISCONTINUED | OUTPATIENT
Start: 2018-07-26 | End: 2018-07-26 | Stop reason: HOSPADM

## 2018-07-25 RX ADMIN — INSULIN ASPART 3 UNITS: 100 INJECTION, SOLUTION INTRAVENOUS; SUBCUTANEOUS at 05:07

## 2018-07-25 RX ADMIN — DOXYCYCLINE HYCLATE 100 MG: 100 TABLET, COATED ORAL at 09:07

## 2018-07-25 RX ADMIN — OXYBUTYNIN CHLORIDE 10 MG: 5 TABLET, EXTENDED RELEASE ORAL at 09:07

## 2018-07-25 RX ADMIN — INSULIN ASPART 3 UNITS: 100 INJECTION, SOLUTION INTRAVENOUS; SUBCUTANEOUS at 11:07

## 2018-07-25 RX ADMIN — ENALAPRIL MALEATE 5 MG: 5 TABLET ORAL at 09:07

## 2018-07-25 RX ADMIN — AMLODIPINE BESYLATE 5 MG: 5 TABLET ORAL at 09:07

## 2018-07-25 RX ADMIN — METOPROLOL TARTRATE 25 MG: 25 TABLET, FILM COATED ORAL at 09:07

## 2018-07-25 RX ADMIN — FUROSEMIDE 40 MG: 40 TABLET ORAL at 09:07

## 2018-07-25 RX ADMIN — WARFARIN SODIUM 5 MG: 5 TABLET ORAL at 05:07

## 2018-07-25 RX ADMIN — PANTOPRAZOLE SODIUM 40 MG: 40 TABLET, DELAYED RELEASE ORAL at 09:07

## 2018-07-25 RX ADMIN — HYDROCODONE BITARTRATE AND ACETAMINOPHEN 1 TABLET: 10; 325 TABLET ORAL at 01:07

## 2018-07-25 RX ADMIN — Medication: at 09:07

## 2018-07-25 RX ADMIN — LEVOTHYROXINE SODIUM 75 MCG: 25 TABLET ORAL at 05:07

## 2018-07-25 RX ADMIN — GABAPENTIN 600 MG: 300 CAPSULE ORAL at 09:07

## 2018-07-25 RX ADMIN — ASPIRIN 81 MG: 81 TABLET, COATED ORAL at 09:07

## 2018-07-25 RX ADMIN — INSULIN ASPART 1 UNITS: 100 INJECTION, SOLUTION INTRAVENOUS; SUBCUTANEOUS at 09:07

## 2018-07-25 RX ADMIN — INSULIN ASPART 2 UNITS: 100 INJECTION, SOLUTION INTRAVENOUS; SUBCUTANEOUS at 05:07

## 2018-07-25 RX ADMIN — CEFTRIAXONE 1 G: 1 INJECTION, SOLUTION INTRAVENOUS at 09:07

## 2018-07-25 RX ADMIN — ATORVASTATIN CALCIUM 40 MG: 40 TABLET, FILM COATED ORAL at 09:07

## 2018-07-25 RX ADMIN — INSULIN DETEMIR 58 UNITS: 100 INJECTION, SOLUTION SUBCUTANEOUS at 09:07

## 2018-07-25 NOTE — NURSING
Patient assessed for diabetes educational needs following chart review  She reports is being discharged to a nursing home facility  Diabetes care will be provided  No further action at this time

## 2018-07-25 NOTE — PROGRESS NOTES
"[facility] Hospital Medicine  Progress Note    Patient Name: Daiana Bear  MRN: [id number]  Patient Class: OP- Observation   Admission Date: 7/22/2018  Length of Stay: 3 days  Attending Physician: Victoriano Porras MD  Primary Care Provider: Suzanne Henson DO        Subjective:     Principal Problem:UTI (urinary tract infection)    HPI:  History obtained from patient, who is not the best historian.  Ms. Bear is a 61yo female  with a PMHx of CAD with remote PCI of unknown vessel, uncontrolled DM II, HTN, HLD, PVD with remote left AKA, OA, h/o gastric bypass, and h/o provoked DVT x 2.  She presented to the ED with c/o generalized weakness and fatigue that has progressively worsened over the past few weeks.  Associated "blister" to right lower ABD fold, chronic back pain, and chronic right knee pain.  No aggravating or alleviating factors.  Denies any CP, palpitations, SOB, STAFFORD, orthopnea, PND, cough, edema, weight gain, ABD pain or distention, N/V/D, constipation, dysuria, urinary frequency or urgency, hematuria, flank pain, lightheadedness/dizziness, syncope, HA, AMS, focal deficits, fever, chills, recent falls or injury.  Patient reports she is unable to care for herself, therefore, her brother is living with her as a caregiver.  Unfortunately, patient states her brother is unable to properly care for her or prepare meals.  She states she has not eaten anything in 2 days, and does not remember the last time she was bathed.  She admits to being noncompliant with multiple medications, including metoprolol, enalapril, amlodipine, HCTZ, Lasix, Synthroid, and insulin.  She states she never misses her Coumadin.  Work-up in ED resulted INR 2, glucose 329, UA consistent with UTI.  CXR showed nonspecific cardiomediastinal silhouette enlargement, decreased lung volumes, no acute infiltrate.  Right knee XR showed no acute findings with moderate to severe primary osteoarthritis.  Troponin was ordered in " ED for unknown reason, patient denies any angina.  Troponin resulted 0.048, , EKG unrevealing.  Hospital Medicine was called to admit for elevated troponin and panniculitis.  Currently, patient appears comfortable in NAD.  Denies any CP or SOB.    Hospital Course:  The pt is a 63 yo female who presented to ED with generalized weakness and fatigue and placed in observation for UTI, Cellulitis to lower abdomen and right LE, and elevated troponin.     As for elevated troponin, Troponin 0.048>0.057>0.042. EKG reviewed, no acute ischemic changes appreciated. Cardiac echo showed mildly depressed left ventricular systolic function (EF 50-55%), diastolic dysfunction, Mild mitral regurgitation. Care discussed with Cardiology who recommended Nuclear stress test which showed WMAs and a fixed defect, no reversible ischemia. The pt was continued on ASA, stain, lopressor, and enalapril. Patient will f/u with Dr. Velázquez ( Cardiology).     Cellulitis resolved with IV vancomycin. The pt will be continued on Doxycycline. Wound care applied dressing to small abdominal wound to pannus. Miconazole applied to cutaneous candidiasis.   Blood cultures show NGTD.   Pt placed on IV Rocephin. Urine culture grew > 100,000 cfu/ml GRAM NEGATIVE AINSLEY, NON-LACTOSE     Identification and susceptibility pending     Pt reports she can not return home. She is unable to care for herself. PT/OT recommended SNF- placement pending.     Interval History:  Right hip soft tissue pain.    Review of Systems   Constitutional: Positive for activity change, appetite change and fatigue. Negative for chills and fever.   HENT: Negative for congestion and sore throat.    Eyes: Negative for visual disturbance.   Respiratory: Positive for shortness of breath (chronic ). Negative for cough and wheezing.    Cardiovascular: Negative for chest pain, palpitations and leg swelling.   Gastrointestinal: Negative for abdominal pain, blood in stool,  constipation, diarrhea, nausea and vomiting.   Genitourinary: Negative for dysuria and hematuria.   Musculoskeletal: Positive for arthralgias and myalgias. Negative for back pain.        Left AKA    Skin: Positive for rash (groin and buttucks ) and wound (lower pannus ).   Neurological: Positive for weakness (generalized ). Negative for dizziness, light-headedness and numbness.   Hematological: Negative for adenopathy.     Objective:     Vital Signs (Most Recent):  Temp: 98.4 °F (36.9 °C) (07/25/18 1224)  Pulse: 67 (07/25/18 1224)  Resp: 18 (07/25/18 1224)  BP: 126/61 (07/25/18 1224)  SpO2: 96 % (07/25/18 1224) Vital Signs (24h Range):  Temp:  [98 °F (36.7 °C)-98.8 °F (37.1 °C)] 98.4 °F (36.9 °C)  Pulse:  [65-79] 67  Resp:  [14-20] 18  SpO2:  [92 %-97 %] 96 %  BP: (106-127)/(53-63) 126/61     Weight: 112 kg (246 lb 14.6 oz)  Body mass index is 48.22 kg/m².    Intake/Output Summary (Last 24 hours) at 07/25/18 1332  Last data filed at 07/25/18 1000   Gross per 24 hour   Intake              780 ml   Output              775 ml   Net                5 ml      Physical Exam   Constitutional: She is oriented to person, place, and time. She appears well-developed and well-nourished. No distress.   HENT:   Head: Normocephalic and atraumatic.   Mouth/Throat: Oropharynx is clear and moist.   Eyes: Conjunctivae and EOM are normal. Pupils are equal, round, and reactive to light.   Neck: Neck supple. No JVD present. No thyromegaly present.   Cardiovascular: Normal rate and regular rhythm.  Exam reveals no gallop and no friction rub.    No murmur heard.  Pulmonary/Chest: Effort normal and breath sounds normal. She has no wheezes. She has no rales.   Abdominal: Soft. Bowel sounds are normal. She exhibits no distension. There is no tenderness. There is no rebound and no guarding.   Musculoskeletal: Normal range of motion. She exhibits no edema or deformity.   Left AKA    Lymphadenopathy:     She has no cervical adenopathy.    Neurological: She is alert and oriented to person, place, and time. She has normal reflexes.   Skin: Skin is warm and dry. Rash (groin and buttock) noted.   1 cm eschar with surrounding erythema right lower abdomen   Psychiatric: She has a normal mood and affect. Her behavior is normal. Judgment and thought content normal.   Nursing note and vitals reviewed.      Significant Labs: All pertinent labs within the past 24 hours have been reviewed.    Significant Imaging: I have reviewed all pertinent imaging results/findings within the past 24 hours.    Assessment/Plan:      * UTI (urinary tract infection)    -GRAM NEGATIVE AINSLEY, NON-LACTOSE    > 100,000 cfu/ml   Identification and susceptibility pending   Cont IV rocephin for now  Blood cultures show NGTD        Acute panniculitis    -transition IV vancomycin to oral Doxycycline and Miconazole         Cellulitis of right lower extremity without abscess    Resolved         Incontinence associated dermatitis    Miconazole           Noncompliance with medication regimen    - Counseled on importance of compliance.        Debility    -cont PT/OT  SNF placement         Elevated troponin with CAD h/o PCI in 2006    No ACS        Uncontrolled type 2 diabetes with peripheral autonomic neuropathy    - Hold metformin, patient no longer taking.  - Continue basal insulin 58 units nightly.  - Accuchecks with SSI.  - Diabetic diet.  - Check HbA1c.        History of DVT on Coumadin therapy    - Therapeutic INR of 2.  Continue Coumadin with daily INR, pharmacy consult for dosing.        Essential hypertension    BP low normal  Will decrease Lasix to 20mg daily and Enalapril to 2.5 mg daily   - Continue amlodipine and metoprolol.            VTE Risk Mitigation         Ordered     warfarin (COUMADIN) tablet 5 mg  Daily      07/22/18 2137     Place sequential compression device  Until discontinued      07/22/18 2130              Suzanne Benson NP  Department of [facility]  Medicine   [facility]

## 2018-07-25 NOTE — SUBJECTIVE & OBJECTIVE
Interval History:  Right hip soft tissue pain.    Review of Systems   Constitutional: Negative for chills and fever.   HENT: Negative for congestion and sore throat.    Eyes: Negative for visual disturbance.   Respiratory: Negative for cough, shortness of breath and wheezing.    Cardiovascular: Negative for chest pain, palpitations and leg swelling.   Gastrointestinal: Negative for abdominal pain, blood in stool, constipation, diarrhea, nausea and vomiting.   Genitourinary: Negative for dysuria and hematuria.   Musculoskeletal: Positive for arthralgias and myalgias. Negative for back pain.   Skin: Negative for rash and wound.   Neurological: Negative for dizziness, weakness, light-headedness and numbness.   Hematological: Negative for adenopathy.     Objective:     Vital Signs (Most Recent):  Temp: 98.8 °F (37.1 °C) (07/25/18 0009)  Pulse: 70 (07/25/18 0009)  Resp: 20 (07/25/18 0009)  BP: (!) 106/53 (07/25/18 0009)  SpO2: (!) 94 % (07/25/18 0009) Vital Signs (24h Range):  Temp:  [98 °F (36.7 °C)-98.8 °F (37.1 °C)] 98.8 °F (37.1 °C)  Pulse:  [70-79] 70  Resp:  [18-22] 20  SpO2:  [94 %-97 %] 94 %  BP: (106-126)/(53-65) 106/53     Weight: 89.1 kg (196 lb 8 oz)  Body mass index is 38.38 kg/m².    Intake/Output Summary (Last 24 hours) at 07/25/18 0043  Last data filed at 07/25/18 0000   Gross per 24 hour   Intake              660 ml   Output             1000 ml   Net             -340 ml      Physical Exam   Constitutional: She is oriented to person, place, and time. She appears well-developed and well-nourished. No distress.   HENT:   Head: Normocephalic and atraumatic.   Mouth/Throat: Oropharynx is clear and moist.   Eyes: Conjunctivae and EOM are normal. Pupils are equal, round, and reactive to light.   Neck: Neck supple. No JVD present. No thyromegaly present.   Cardiovascular: Normal rate and regular rhythm.  Exam reveals no gallop and no friction rub.    No murmur heard.  Pulmonary/Chest: Effort normal and breath  sounds normal. She has no wheezes. She has no rales.   Abdominal: Soft. Bowel sounds are normal. She exhibits no distension. There is no tenderness. There is no rebound and no guarding.   Musculoskeletal: Normal range of motion. She exhibits no edema or deformity.   Right hip and pelvic pain   Lymphadenopathy:     She has no cervical adenopathy.   Neurological: She is alert and oriented to person, place, and time. She has normal reflexes.   Skin: Skin is warm and dry. No rash noted.   1 cm eschar with surrounding erythema right lower abdomen   Psychiatric: She has a normal mood and affect. Her behavior is normal. Judgment and thought content normal.   Nursing note and vitals reviewed.      Significant Labs: All pertinent labs within the past 24 hours have been reviewed.    Significant Imaging: I have reviewed all pertinent imaging results/findings within the past 24 hours.

## 2018-07-25 NOTE — ASSESSMENT & PLAN NOTE
-GRAM NEGATIVE AINSLEY, NON-LACTOSE    > 100,000 cfu/ml   Identification and susceptibility pending   Cont IV rocephin for now  Blood cultures show NGTD

## 2018-07-25 NOTE — PROGRESS NOTES
Clinical Pharmacy Progress Note: Coumadin Dosing and Monitoring     Goal INR: 2-3  Indication: History of DVT x 2   Lab Results   Component Value Date    INR 2.4 (H) 07/25/2018    INR 2.3 (H) 07/24/2018    INR 2.3 (H) 07/23/2018     Current dose: 5 mg by mouth daily     Plan: Patient has been educated by pharmacist this admission.      Current dose: Patient's home dose is Patient's home dose is 4 mg by mouth every day of the week except 5 mg on Tues/Thurs.     PT/INR will be monitored daily. Dose adjustments will be made accordingly.      Thank you for allowing us to participate in this patient's care.    Jayla Almanza, PharmD 7/25/2018 7:43 AM

## 2018-07-25 NOTE — PLAN OF CARE
SW consult for SNF Placement  Sw met with pt about Snf Placement. Pt chose  1. [facility]  2. [facility]  3. [facility]  Pt signed patient's choice form. The original was placed in chart. Sw submitted SNF packet in [facility].

## 2018-07-25 NOTE — PLAN OF CARE
Problem: Physical Therapy Goal  Goal: Physical Therapy Goal  LTGs to be met within 7 days (7/30/18):  1.  Patient will perform bed mobility with SBA  2.  Patient will participate in functional transfer assessment  3.  Patient will perform BLE therapeutic exercises 10 x 2 in all available planes  4.  Patient will demo Good static/dynamic sitting balance   Outcome: Ongoing (interventions implemented as appropriate)  PT T/F TO WC WITH MOD A X 2 AND WC PROPULSION X 50' WITH MIN A.

## 2018-07-25 NOTE — PT/OT/SLP PROGRESS
Occupational Therapy  Treatment    Daiana Bear   MRN: [id number]   Admitting Diagnosis: UTI (urinary tract infection)    OT Date of Treatment: 07/25/18   OT Start Time: 0945  OT Stop Time: 1015  OT Total Time (min): 30 min    Billable Minutes:  Self Care/Home Management 15 minutes and Therapeutic Activity 15 minutes    General Precautions: Standard, fall  Orthopedic Precautions: N/A  Braces: N/A         Subjective:  Communicated with nurse and epic chart review prior to session.    Pain/Comfort  Pain Rating 1: 8/10  Location - Side 1: Right  Location - Orientation 1: lower  Location 1: hip    Objective:  Patient found with: garcia catheter     Functional Mobility:  Bed Mobility:   cga/ sba    Transfers:  Mod a x 2 with bed>w/c        Functional Ambulation: na    Activities of Daily Living:     Feeding adaptive equipment: na     UE adaptive equipment: na     LE adaptive equipment: na                    Bathing adaptive equipment: na    Balance:   Static Sit: GOOD-: Takes MODERATE challenges from all directions but inconsistently  Dynamic Sit: FAIR+: Maintains balance through MINIMAL excursions of active trunk motion  Static Stand: POOR: Needs MODERATE assist to maintain  Dynamic stand: POOR: Needs MOD (moderate) assist during gait    Therapeutic Activities and Exercises:  Pt seen in room. Pt req cga/ sba with bed  Mobility and forward scooting. Pt req mod a x 2 with stand>pivot from bed to w/c. Pt performed w/c mobility x 50 feet x 2 at slow pace and r le    AM-PAC 6 CLICK ADL   How much help from another person does this patient currently need?   1 = Unable, Total/Dependent Assistance  2 = A lot, Maximum/Moderate Assistance  3 = A little, Minimum/Contact Guard/Supervision  4 = None, Modified [place]/Independent    Putting on and taking off regular lower body clothing? : 2  Bathing (including washing, rinsing, drying)?: 2  Toileting, which includes using toilet, bedpan, or urinal? : 2  Putting on and taking off  "regular upper body clothing?: 2  Taking care of personal grooming such as brushing teeth?: 3  Eating meals?: 3  Daily Activity Total Score: 14     AM-PAC Raw Score CMS "G-Code Modifier Level of Impairment Assistance   6 % Total / Unable   7 - 8 CM 80 - 100% Maximal Assist   9-13 CL 60 - 80% Moderate Assist   14 - 19 CK 40 - 60% Moderate Assist   20 - 22 CJ 20 - 40% Minimal Assist   23 CI 1-20% SBA / CGA   24 CH 0% Independent/ Mod I       Patient left sitting in w/c with all lines intact, call button in reach and nurse notified    ASSESSMENT:  Daiana Bear is a 62 y.o. female with a medical diagnosis of UTI (urinary tract infection) and presents with debility and generalized weakness. Pt will continue to benefit from skilled O.T.    Rehab identified problem list/impairments: Rehab identified problem list/impairments: weakness, impaired self care skills, impaired balance, decreased safety awareness, impaired endurance, impaired functional mobilty, gait instability, decreased lower extremity function, decreased upper extremity function    Rehab potential is good.    Activity tolerance: Good    Discharge recommendations: Discharge Facility/Level Of Care Needs: nursing facility, skilled     Barriers to discharge: Barriers to Discharge: None    Equipment recommendations: none     GOALS:    Occupational Therapy Goals        Problem: Occupational Therapy Goal    Goal Priority Disciplines Outcome Interventions   Occupational Therapy Goal     OT, PT/OT Ongoing (interventions implemented as appropriate)    Description:  Goals to be met by: 7-30-18    Patient will increase functional independence with ADLs by performing:    UE Dressing with Stand-by Assistance.  LE Dressing with Stand-by Assistance.  Upper extremity exercise program x 15 reps min resistance                      Plan:  Patient to be seen 3 x/week to address the above listed problems via self-care/home management, therapeutic activities, therapeutic " exercises  Plan of Care expires: 07/30/18  Plan of Care reviewed with: patient         Gayathri Garcíazier, OT  07/25/2018

## 2018-07-25 NOTE — PROGRESS NOTES
[facility] called back and they do not have a female bed available. [facility] requested [facility] fax a copy of pt.'s medicare card front and back side. [facility] faxed copy of card to [phone]. Pepper will fax [phone] and PASRR to [facility]. [facility] spoke with Nel at facility and she reports a representative will come and assess pt.

## 2018-07-25 NOTE — PLAN OF CARE
Problem: Patient Care Overview  Goal: Plan of Care Review  Outcome: Ongoing (interventions implemented as appropriate)  Fall precautions maintained. Pt free from falls/injuries. Pt repositions with assistance x2. Pt has occasional c/o pain. Pain moderatley controlled with PRN medication. Diabetic diet maintained and tolerated. Glycemic control provided with ACHS glucose monitoring. Fluid promotion with PO fluids. Specialty bed in use. Patient is turned every 2 hours. POC and meds reviewed. Patient verbalized understanding. Side rails up x2. Bed Low and locked. Personal items and call light within reach. No signs/symptoms of acute distress. Chart check done. Will monitor

## 2018-07-25 NOTE — SUBJECTIVE & OBJECTIVE
Interval History:  Right hip soft tissue pain.    Review of Systems   Constitutional: Positive for activity change, appetite change and fatigue. Negative for chills and fever.   HENT: Negative for congestion and sore throat.    Eyes: Negative for visual disturbance.   Respiratory: Positive for shortness of breath (chronic ). Negative for cough and wheezing.    Cardiovascular: Negative for chest pain, palpitations and leg swelling.   Gastrointestinal: Negative for abdominal pain, blood in stool, constipation, diarrhea, nausea and vomiting.   Genitourinary: Negative for dysuria and hematuria.   Musculoskeletal: Positive for arthralgias and myalgias. Negative for back pain.        Left AKA    Skin: Positive for rash (groin and buttucks ) and wound (lower pannus ).   Neurological: Positive for weakness (generalized ). Negative for dizziness, light-headedness and numbness.   Hematological: Negative for adenopathy.     Objective:     Vital Signs (Most Recent):  Temp: 98.4 °F (36.9 °C) (07/25/18 1224)  Pulse: 67 (07/25/18 1224)  Resp: 18 (07/25/18 1224)  BP: 126/61 (07/25/18 1224)  SpO2: 96 % (07/25/18 1224) Vital Signs (24h Range):  Temp:  [98 °F (36.7 °C)-98.8 °F (37.1 °C)] 98.4 °F (36.9 °C)  Pulse:  [65-79] 67  Resp:  [14-20] 18  SpO2:  [92 %-97 %] 96 %  BP: (106-127)/(53-63) 126/61     Weight: 112 kg (246 lb 14.6 oz)  Body mass index is 48.22 kg/m².    Intake/Output Summary (Last 24 hours) at 07/25/18 1332  Last data filed at 07/25/18 1000   Gross per 24 hour   Intake              780 ml   Output              775 ml   Net                5 ml      Physical Exam   Constitutional: She is oriented to person, place, and time. She appears well-developed and well-nourished. No distress.   HENT:   Head: Normocephalic and atraumatic.   Mouth/Throat: Oropharynx is clear and moist.   Eyes: Conjunctivae and EOM are normal. Pupils are equal, round, and reactive to light.   Neck: Neck supple. No JVD present. No thyromegaly present.    Cardiovascular: Normal rate and regular rhythm.  Exam reveals no gallop and no friction rub.    No murmur heard.  Pulmonary/Chest: Effort normal and breath sounds normal. She has no wheezes. She has no rales.   Abdominal: Soft. Bowel sounds are normal. She exhibits no distension. There is no tenderness. There is no rebound and no guarding.   Musculoskeletal: Normal range of motion. She exhibits no edema or deformity.   Left AKA    Lymphadenopathy:     She has no cervical adenopathy.   Neurological: She is alert and oriented to person, place, and time. She has normal reflexes.   Skin: Skin is warm and dry. Rash (groin and buttock) noted.   1 cm eschar with surrounding erythema right lower abdomen   Psychiatric: She has a normal mood and affect. Her behavior is normal. Judgment and thought content normal.   Nursing note and vitals reviewed.      Significant Labs: All pertinent labs within the past 24 hours have been reviewed.    Significant Imaging: I have reviewed all pertinent imaging results/findings within the past 24 hours.

## 2018-07-25 NOTE — PROGRESS NOTES
Pharmacy Brief Progress Note: Coumadin Education     Patient educated on warfarin indication, side effects, and drug interactions. Discussed importance of medication compliance and INR monitoring and reviewed signs of abnormal bleeding. Patient given warfarin educational handout. Patient expressed understanding and had no further questions.    Patient's home dose is 4 mg by mouth every day of the week except 5 mg on Tues/Thurs.     Thank you for allowing us to participate in this patient's care.     Jayla Almanza 7/25/2018 1:17 PM

## 2018-07-25 NOTE — PROGRESS NOTES
"[facility] Hospital Medicine  Progress Note    Patient Name: Daiana Bear  MRN: [id number]  Patient Class: IP- Inpatient   Admission Date: 7/22/2018  Length of Stay: 3 days  Attending Physician: Victoriano Porras MD  Primary Care Provider: Suzanne Henson DO        Subjective:     Principal Problem:UTI (urinary tract infection)    HPI:  History obtained from patient, who is not the best historian.  Ms. Bear is a 61yo female  with a PMHx of CAD with remote PCI of unknown vessel, uncontrolled DM II, HTN, HLD, PVD with remote left AKA, OA, h/o gastric bypass, and h/o provoked DVT x 2.  She presented to the ED with c/o generalized weakness and fatigue that has progressively worsened over the past few weeks.  Associated "blister" to right lower ABD fold, chronic back pain, and chronic right knee pain.  No aggravating or alleviating factors.  Denies any CP, palpitations, SOB, STAFFORD, orthopnea, PND, cough, edema, weight gain, ABD pain or distention, N/V/D, constipation, dysuria, urinary frequency or urgency, hematuria, flank pain, lightheadedness/dizziness, syncope, HA, AMS, focal deficits, fever, chills, recent falls or injury.  Patient reports she is unable to care for herself, therefore, her brother is living with her as a caregiver.  Unfortunately, patient states her brother is unable to properly care for her or prepare meals.  She states she has not eaten anything in 2 days, and does not remember the last time she was bathed.  She admits to being noncompliant with multiple medications, including metoprolol, enalapril, amlodipine, HCTZ, Lasix, Synthroid, and insulin.  She states she never misses her Coumadin.  Work-up in ED resulted INR 2, glucose 329, UA consistent with UTI.  CXR showed nonspecific cardiomediastinal silhouette enlargement, decreased lung volumes, no acute infiltrate.  Right knee XR showed no acute findings with moderate to severe primary osteoarthritis.  Troponin was ordered in ED " for unknown reason, patient denies any angina.  Troponin resulted 0.048, , EKG unrevealing.  Hospital Medicine was called to admit for elevated troponin and panniculitis.  Currently, patient appears comfortable in NAD.  Denies any CP or SOB.    Hospital Course:  No notes on file    Interval History:  Right hip soft tissue pain.    Review of Systems   Constitutional: Negative for chills and fever.   HENT: Negative for congestion and sore throat.    Eyes: Negative for visual disturbance.   Respiratory: Negative for cough, shortness of breath and wheezing.    Cardiovascular: Negative for chest pain, palpitations and leg swelling.   Gastrointestinal: Negative for abdominal pain, blood in stool, constipation, diarrhea, nausea and vomiting.   Genitourinary: Negative for dysuria and hematuria.   Musculoskeletal: Positive for arthralgias and myalgias. Negative for back pain.   Skin: Negative for rash and wound.   Neurological: Negative for dizziness, weakness, light-headedness and numbness.   Hematological: Negative for adenopathy.     Objective:     Vital Signs (Most Recent):  Temp: 98.8 °F (37.1 °C) (07/25/18 0009)  Pulse: 70 (07/25/18 0009)  Resp: 20 (07/25/18 0009)  BP: (!) 106/53 (07/25/18 0009)  SpO2: (!) 94 % (07/25/18 0009) Vital Signs (24h Range):  Temp:  [98 °F (36.7 °C)-98.8 °F (37.1 °C)] 98.8 °F (37.1 °C)  Pulse:  [70-79] 70  Resp:  [18-22] 20  SpO2:  [94 %-97 %] 94 %  BP: (106-126)/(53-65) 106/53     Weight: 89.1 kg (196 lb 8 oz)  Body mass index is 38.38 kg/m².    Intake/Output Summary (Last 24 hours) at 07/25/18 0043  Last data filed at 07/25/18 0000   Gross per 24 hour   Intake              660 ml   Output             1000 ml   Net             -340 ml      Physical Exam   Constitutional: She is oriented to person, place, and time. She appears well-developed and well-nourished. No distress.   HENT:   Head: Normocephalic and atraumatic.   Mouth/Throat: Oropharynx is clear and moist.   Eyes: Conjunctivae  and EOM are normal. Pupils are equal, round, and reactive to light.   Neck: Neck supple. No JVD present. No thyromegaly present.   Cardiovascular: Normal rate and regular rhythm.  Exam reveals no gallop and no friction rub.    No murmur heard.  Pulmonary/Chest: Effort normal and breath sounds normal. She has no wheezes. She has no rales.   Abdominal: Soft. Bowel sounds are normal. She exhibits no distension. There is no tenderness. There is no rebound and no guarding.   Musculoskeletal: Normal range of motion. She exhibits no edema or deformity.   Right hip and pelvic pain   Lymphadenopathy:     She has no cervical adenopathy.   Neurological: She is alert and oriented to person, place, and time. She has normal reflexes.   Skin: Skin is warm and dry. No rash noted.   1 cm eschar with surrounding erythema right lower abdomen   Psychiatric: She has a normal mood and affect. Her behavior is normal. Judgment and thought content normal.   Nursing note and vitals reviewed.      Significant Labs: All pertinent labs within the past 24 hours have been reviewed.    Significant Imaging: I have reviewed all pertinent imaging results/findings within the past 24 hours.    Assessment/Plan:      * UTI (urinary tract infection)    - Urine culture pending, follow sensitivities.   - Empiric IV Rocephin.        Noncompliance with medication regimen    - Counseled on importance of compliance.        Cellulitis of right lower extremity without abscess    - Empiric IV Vanc, pharmacy consult for dosing.  - PRN analgesics.  - Wound Care consult in AM.        Debility    - PT/OT consult to eval and treat.  - Social Work consult for DC planning, will need placement.        Elevated troponin with CAD h/o PCI in 2006    - Patient denies any current or recent angina/anginal equivalent.   - Initial troponin 0.048, EKG unrevealing.  Follow serial results.   - Check CPK, FLP.  - TTE in AM.  - ASA, BB, and statin.  - Patient followed by Dr. Velázquez  "([facility]).  Last f/u 2/2017 with "normal" MPI and echo, per patient.        Uncontrolled type 2 diabetes with peripheral autonomic neuropathy    - Hold metformin, patient no longer taking.  - Continue basal insulin 58 units nightly.  - Accuchecks with SSI.  - Diabetic diet.  - Check HbA1c.        History of DVT on Coumadin therapy    - Therapeutic INR of 2.  Continue Coumadin with daily INR, pharmacy consult for dosing.        Essential hypertension    - BP stable but above goal due to medication noncompliance.  - Continue enalapril, amlodipine, and metoprolol.  Resume PO Lasix.  Monitor BP trends and titrate as needed.        Acute panniculitis    - Likely infective.  - Empiric IV Vanc, pharmacy consult for dosing.  - Wound Care consult in AM.          VTE Risk Mitigation         Ordered     warfarin (COUMADIN) tablet 5 mg  Daily      07/22/18 2137     Place sequential compression device  Until discontinued      07/22/18 2130              Victoriano Porras MD  Department of Hospital Medicine   [facility]  "

## 2018-07-25 NOTE — PLAN OF CARE
Problem: Patient Care Overview  Goal: Plan of Care Review  Outcome: Ongoing (interventions implemented as appropriate)  Remains free from injury. Denies pain. Wound care performed as ordered. POCT performed, coverage given. Vital signs stable. Chart reviewed. Will continue to monitor.

## 2018-07-25 NOTE — ASSESSMENT & PLAN NOTE
BP low normal  Will decrease Lasix to 20mg daily and Enalapril to 2.5 mg daily   - Continue amlodipine and metoprolol.

## 2018-07-25 NOTE — HOSPITAL COURSE
The pt is a 61 yo female who presented to ED with generalized weakness and fatigue and placed in observation for UTI, Cellulitis to lower abdomen and right LE, and elevated troponin.     As for elevated troponin, Troponin 0.048>0.057>0.042. EKG reviewed, no acute ischemic changes appreciated. Cardiac echo showed mildly depressed left ventricular systolic function (EF 50-55%), diastolic dysfunction, Mild mitral regurgitation. Care discussed with Cardiology who recommended Nuclear stress test which showed WMAs and a fixed defect, no reversible ischemia. The pt was continued on ASA, stain, lopressor, and enalapril. Patient will f/u with Dr. Velázquez ( Cardiology).     Cellulitis resolved with IV vancomycin. The pt will be continued on Doxycycline. Wound care applied dressing to small abdominal wound to pannus. Miconazole applied to cutaneous candidiasis.   Blood cultures show NGTD.   Pt placed on IV Rocephin. Urine culture grew > 100,000 cfu/ml E coli sensitive to Doxycycline. Pt will be discharged on oral Doxycycline.   Pt reported she could not return home. She is unable to care for herself. PT/OT recommended SNF. Pt will be discharged to [facility].

## 2018-07-25 NOTE — PT/OT/SLP PROGRESS
Physical Therapy  Treatment    Daiana Bear   MRN: [id number]   Admitting Diagnosis: UTI (urinary tract infection)    PT Received On: 07/25/18  PT Start Time: 1050     PT Stop Time: 1115    PT Total Time (min): 25 min       Billable Minutes:  Therapeutic Activity 15 and Therapeutic Exercise 10    Treatment Type: Treatment  PT/PTA: PT             General Precautions: Standard, fall  Orthopedic Precautions: N/A   Braces: N/A    Do you have any cultural, spiritual, Muslim conflicts, given your current situation?: no    Subjective:  Communicated with NURSE CHAPMAN AND [facility] CHART REVIEW  prior to session.   PT AGREED TO TX    Pain/Comfort  Pain Rating 1: 8/10  Location - Side 1: Right  Location 1: hip  Pain Rating Post-Intervention 1: 8/10    Objective:   Patient found with: peripheral IV, telemetry, garcia catheter    Functional Mobility:  PT MET IN RM SUP>SIT EOB WITH MIN A AND SCOOTED TO EOB WITH MIN A. PT COMPLETED SQUAT PIVOT T/F TO WC WITH MOD A X 2. PT SCOOTED BACK IN WC WITH MIN A. PT PROPELLED WC X 50' WITH MIN A. PT RETURNED TO RM. PT COMPLETED R LE AP, TKE, AND MIP. PT LEFT SEATED IN CHAIR AND LEFT WITH ALL NEEDS MET.     AM-PAC 6 CLICK MOBILITY  How much help from another person does this patient currently need?   1 = Unable, Total/Dependent Assistance  2 = A lot, Maximum/Moderate Assistance  3 = A little, Minimum/Contact Guard/Supervision  4 = None, Modified [place]/Independent    Turning over in bed (including adjusting bedclothes, sheets and blankets)?: 3  Sitting down on and standing up from a chair with arms (e.g., wheelchair, bedside commode, etc.): 2  Moving from lying on back to sitting on the side of the bed?: 3  Moving to and from a bed to a chair (including a wheelchair)?: 2  Need to walk in hospital room?: 1  Climbing 3-5 steps with a railing?: 1  Basic Mobility Total Score: 12    AM-PAC Raw Score CMS G-Code Modifier Level of Impairment Assistance   6 % Total / Unable   7 - 9 CM 80  - 100% Maximal Assist   10 - 14 CL 60 - 80% Moderate Assist   15 - 19 CK 40 - 60% Moderate Assist   20 - 22 CJ 20 - 40% Minimal Assist   23 CI 1-20% SBA / CGA   24 CH 0% Independent/ Mod I     Patient left up in chair with call button in reach.    Assessment:  PT PROGRESSING WITH T/F TRAINING AND WC MOBILITY      Rehab identified problem list/impairments: Rehab identified problem list/impairments: weakness, impaired endurance, impaired functional mobilty, decreased lower extremity function, decreased upper extremity function, pain, decreased coordination, impaired balance, impaired self care skills, decreased safety awareness    Rehab potential is good.    Activity tolerance: Fair    Discharge recommendations: Discharge Facility/Level Of Care Needs: nursing facility, skilled     Barriers to discharge:      Equipment recommendations: Equipment Needed After Discharge: none     GOALS:    Physical Therapy Goals        Problem: Physical Therapy Goal    Goal Priority Disciplines Outcome Goal Variances Interventions   Physical Therapy Goal     PT/OT, PT Ongoing (interventions implemented as appropriate)     Description:  LTGs to be met within 7 days (7/30/18):  1.  Patient will perform bed mobility with SBA  2.  Patient will participate in functional transfer assessment  3.  Patient will perform BLE therapeutic exercises 10 x 2 in all available planes  4.  Patient will demo Good static/dynamic sitting balance                    PLAN:    Patient to be seen 5 x/week  to address the above listed problems via therapeutic activities, therapeutic exercises, wheelchair management/training  Plan of Care expires: 07/30/18  Plan of Care reviewed with: patient         Joyce Romeo, PT  07/25/2018

## 2018-07-26 VITALS
BODY MASS INDEX: 48.48 KG/M2 | OXYGEN SATURATION: 96 % | WEIGHT: 246.94 LBS | HEIGHT: 60 IN | RESPIRATION RATE: 20 BRPM | HEART RATE: 76 BPM | SYSTOLIC BLOOD PRESSURE: 145 MMHG | DIASTOLIC BLOOD PRESSURE: 86 MMHG | TEMPERATURE: 98 F

## 2018-07-26 LAB
ANION GAP SERPL CALC-SCNC: 10 MMOL/L
BACTERIA UR CULT: NORMAL
BASOPHILS # BLD AUTO: 0.04 K/UL
BASOPHILS NFR BLD: 0.4 %
BUN SERPL-MCNC: 28 MG/DL
CALCIUM SERPL-MCNC: 8.5 MG/DL
CHLORIDE SERPL-SCNC: 100 MMOL/L
CO2 SERPL-SCNC: 28 MMOL/L
CREAT SERPL-MCNC: 1.3 MG/DL
DIFFERENTIAL METHOD: ABNORMAL
EOSINOPHIL # BLD AUTO: 0.4 K/UL
EOSINOPHIL NFR BLD: 4.1 %
ERYTHROCYTE [DISTWIDTH] IN BLOOD BY AUTOMATED COUNT: 13.7 %
EST. GFR  (AFRICAN AMERICAN): 50 ML/MIN/1.73 M^2
EST. GFR  (NON AFRICAN AMERICAN): 43 ML/MIN/1.73 M^2
GLUCOSE SERPL-MCNC: 292 MG/DL
HCT VFR BLD AUTO: 36.1 %
HGB BLD-MCNC: 11.7 G/DL
INR PPP: 2.6
LYMPHOCYTES # BLD AUTO: 2.2 K/UL
LYMPHOCYTES NFR BLD: 23.6 %
MCH RBC QN AUTO: 27.9 PG
MCHC RBC AUTO-ENTMCNC: 32.4 G/DL
MCV RBC AUTO: 86 FL
MONOCYTES # BLD AUTO: 0.7 K/UL
MONOCYTES NFR BLD: 7 %
NEUTROPHILS # BLD AUTO: 6 K/UL
NEUTROPHILS NFR BLD: 64.9 %
PLATELET # BLD AUTO: 199 K/UL
PMV BLD AUTO: 11 FL
POCT GLUCOSE: 218 MG/DL (ref 70–110)
POCT GLUCOSE: 292 MG/DL (ref 70–110)
POCT GLUCOSE: 312 MG/DL (ref 70–110)
POTASSIUM SERPL-SCNC: 3.9 MMOL/L
PROTHROMBIN TIME: 27.3 SEC
RBC # BLD AUTO: 4.19 M/UL
SODIUM SERPL-SCNC: 138 MMOL/L
WBC # BLD AUTO: 9.24 K/UL

## 2018-07-26 PROCEDURE — G0378 HOSPITAL OBSERVATION PER HR: HCPCS

## 2018-07-26 PROCEDURE — 96372 THER/PROPH/DIAG INJ SC/IM: CPT

## 2018-07-26 PROCEDURE — 25000003 PHARM REV CODE 250: Performed by: NURSE PRACTITIONER

## 2018-07-26 PROCEDURE — 97110 THERAPEUTIC EXERCISES: CPT

## 2018-07-26 PROCEDURE — 25000003 PHARM REV CODE 250: Performed by: HOSPITALIST

## 2018-07-26 PROCEDURE — 97530 THERAPEUTIC ACTIVITIES: CPT

## 2018-07-26 PROCEDURE — 97535 SELF CARE MNGMENT TRAINING: CPT | Mod: 59

## 2018-07-26 PROCEDURE — 85025 COMPLETE CBC W/AUTO DIFF WBC: CPT

## 2018-07-26 PROCEDURE — 80048 BASIC METABOLIC PNL TOTAL CA: CPT

## 2018-07-26 PROCEDURE — 85610 PROTHROMBIN TIME: CPT

## 2018-07-26 RX ORDER — ATORVASTATIN CALCIUM 40 MG/1
40 TABLET, FILM COATED ORAL NIGHTLY
Qty: 90 TABLET | Refills: 3
Start: 2018-07-26 | End: 2019-07-26

## 2018-07-26 RX ORDER — POLYETHYLENE GLYCOL 3350 17 G/17G
17 POWDER, FOR SOLUTION ORAL DAILY
Status: DISCONTINUED | OUTPATIENT
Start: 2018-07-26 | End: 2018-07-26 | Stop reason: HOSPADM

## 2018-07-26 RX ORDER — POLYETHYLENE GLYCOL 3350 17 G/17G
17 POWDER, FOR SOLUTION ORAL DAILY
Refills: 0
Start: 2018-07-26

## 2018-07-26 RX ORDER — ENALAPRIL MALEATE 2.5 MG/1
2.5 TABLET ORAL DAILY
Qty: 90 TABLET | Refills: 3
Start: 2018-07-27 | End: 2019-07-27

## 2018-07-26 RX ORDER — DOXYCYCLINE HYCLATE 100 MG
100 TABLET ORAL EVERY 12 HOURS
Qty: 14 TABLET | Refills: 0 | Status: SHIPPED | OUTPATIENT
Start: 2018-07-26 | End: 2018-08-02

## 2018-07-26 RX ORDER — MICONAZOLE NITRATE 2 %
POWDER (GRAM) TOPICAL 2 TIMES DAILY
Refills: 0 | COMMUNITY
Start: 2018-07-26

## 2018-07-26 RX ORDER — FUROSEMIDE 20 MG/1
20 TABLET ORAL DAILY
Qty: 30 TABLET | Refills: 11
Start: 2018-07-27 | End: 2019-07-27

## 2018-07-26 RX ORDER — HYDROCODONE BITARTRATE AND ACETAMINOPHEN 5; 325 MG/1; MG/1
1 TABLET ORAL EVERY 8 HOURS PRN
Qty: 10 TABLET | Refills: 0 | Status: SHIPPED | OUTPATIENT
Start: 2018-07-26

## 2018-07-26 RX ORDER — HYDROCODONE BITARTRATE AND ACETAMINOPHEN 10; 325 MG/1; MG/1
1 TABLET ORAL EVERY 8 HOURS PRN
Qty: 10 TABLET | Refills: 0 | Status: SHIPPED | OUTPATIENT
Start: 2018-07-26 | End: 2018-07-26 | Stop reason: HOSPADM

## 2018-07-26 RX ORDER — DOCUSATE SODIUM 100 MG/1
100 CAPSULE, LIQUID FILLED ORAL 2 TIMES DAILY
Refills: 0 | COMMUNITY
Start: 2018-07-26

## 2018-07-26 RX ORDER — ASPIRIN 81 MG/1
81 TABLET ORAL DAILY
Refills: 0
Start: 2018-07-27 | End: 2019-07-27

## 2018-07-26 RX ADMIN — ENALAPRIL MALEATE 2.5 MG: 2.5 TABLET ORAL at 09:07

## 2018-07-26 RX ADMIN — POLYETHYLENE GLYCOL (3350) 17 G: 17 POWDER, FOR SOLUTION ORAL at 03:07

## 2018-07-26 RX ADMIN — WARFARIN SODIUM 5 MG: 5 TABLET ORAL at 05:07

## 2018-07-26 RX ADMIN — OXYBUTYNIN CHLORIDE 10 MG: 5 TABLET, EXTENDED RELEASE ORAL at 09:07

## 2018-07-26 RX ADMIN — DOXYCYCLINE HYCLATE 100 MG: 100 TABLET, COATED ORAL at 09:07

## 2018-07-26 RX ADMIN — INSULIN ASPART 2 UNITS: 100 INJECTION, SOLUTION INTRAVENOUS; SUBCUTANEOUS at 05:07

## 2018-07-26 RX ADMIN — HYDROCODONE BITARTRATE AND ACETAMINOPHEN 1 TABLET: 10; 325 TABLET ORAL at 09:07

## 2018-07-26 RX ADMIN — FUROSEMIDE 20 MG: 20 TABLET ORAL at 09:07

## 2018-07-26 RX ADMIN — GABAPENTIN 600 MG: 300 CAPSULE ORAL at 09:07

## 2018-07-26 RX ADMIN — AMLODIPINE BESYLATE 5 MG: 5 TABLET ORAL at 09:07

## 2018-07-26 RX ADMIN — PANTOPRAZOLE SODIUM 40 MG: 40 TABLET, DELAYED RELEASE ORAL at 09:07

## 2018-07-26 RX ADMIN — METOPROLOL TARTRATE 25 MG: 25 TABLET, FILM COATED ORAL at 09:07

## 2018-07-26 RX ADMIN — ASPIRIN 81 MG: 81 TABLET, COATED ORAL at 09:07

## 2018-07-26 RX ADMIN — INSULIN ASPART 4 UNITS: 100 INJECTION, SOLUTION INTRAVENOUS; SUBCUTANEOUS at 11:07

## 2018-07-26 RX ADMIN — Medication: at 09:07

## 2018-07-26 RX ADMIN — LEVOTHYROXINE SODIUM 75 MCG: 25 TABLET ORAL at 05:07

## 2018-07-26 RX ADMIN — INSULIN ASPART 3 UNITS: 100 INJECTION, SOLUTION INTRAVENOUS; SUBCUTANEOUS at 05:07

## 2018-07-26 NOTE — PT/OT/SLP PROGRESS
Physical Therapy  Treatment    Daiana Bear   MRN: [id number]   Admitting Diagnosis: UTI (urinary tract infection)    PT Received On: 07/26/18  PT Start Time: 1045     PT Stop Time: 1110    PT Total Time (min): 25 min       Billable Minutes:  Therapeutic Activity 17 and Therapeutic Exercise 8    Treatment Type: Treatment  PT/PTA: PT             General Precautions: Standard, fall  Orthopedic Precautions: N/A   Braces: N/A    Do you have any cultural, spiritual, Restorationist conflicts, given your current situation?: no    Subjective:  Communicated with NURSE CHAPMAN AND [facility] CHART REVIEW  prior to session.   PT AGREED TO TX     Pain/Comfort  Pain Rating 1: 3/10  Location 1: back  Pain Rating Post-Intervention 1: 3/10    Objective:        Functional Mobility:  PT LOG ROLLED TO LEFT AND SEATED EOB WITH CGA. PT SCOOTED TO EOB WITH SBA. PT COMPLETED SQUAT PIVOT T/F TO RIGHT WITH MOD A . PT SCOOTED BACK IN CHAIR WITH SBA. PT COMPLETED WC PROPULSION 2X45' WITH MIN>SBA. PT RETURNED TO RM  AND COMPLETE B LE MIP, AND R LE TKE AND AP. PT LEFT SEATED IN WC WITH ALL NEEDS MET.     AM-PAC 6 CLICK MOBILITY  How much help from another person does this patient currently need?   1 = Unable, Total/Dependent Assistance  2 = A lot, Maximum/Moderate Assistance  3 = A little, Minimum/Contact Guard/Supervision  4 = None, Modified [place]/Independent    Turning over in bed (including adjusting bedclothes, sheets and blankets)?: 3  Sitting down on and standing up from a chair with arms (e.g., wheelchair, bedside commode, etc.): 1  Moving from lying on back to sitting on the side of the bed?: 3  Moving to and from a bed to a chair (including a wheelchair)?: 2  Need to walk in hospital room?: 1  Climbing 3-5 steps with a railing?: 1  Basic Mobility Total Score: 11    AM-PAC Raw Score CMS G-Code Modifier Level of Impairment Assistance   6 % Total / Unable   7 - 9 CM 80 - 100% Maximal Assist   10 - 14 CL 60 - 80% Moderate Assist   15 -  19 CK 40 - 60% Moderate Assist   20 - 22 CJ 20 - 40% Minimal Assist   23 CI 1-20% SBA / CGA   24 CH 0% Independent/ Mod I     Patient left up in chair with call button in reach.    Assessment:  PT SAM TX AND PROGRESSING WITH GROSS FUNC MOBILITY     Rehab identified problem list/impairments: Rehab identified problem list/impairments: weakness, impaired endurance, impaired functional mobilty, decreased lower extremity function, impaired balance, impaired self care skills, decreased safety awareness, decreased ROM, pain    Rehab potential is good.    Activity tolerance: Fair    Discharge recommendations: Discharge Facility/Level Of Care Needs: nursing facility, skilled     Barriers to discharge:      Equipment recommendations: Equipment Needed After Discharge: none     GOALS:    Physical Therapy Goals        Problem: Physical Therapy Goal    Goal Priority Disciplines Outcome Goal Variances Interventions   Physical Therapy Goal     PT/OT, PT Ongoing (interventions implemented as appropriate)     Description:  LTGs to be met within 7 days (7/30/18):  1.  Patient will perform bed mobility with SBA  2.  Patient will participate in functional transfer assessment  3.  Patient will perform BLE therapeutic exercises 10 x 2 in all available planes  4.  Patient will demo Good static/dynamic sitting balance                    PLAN:    Patient to be seen 5 x/week  to address the above listed problems via therapeutic activities, therapeutic exercises, wheelchair management/training  Plan of Care expires: 07/30/18  Plan of Care reviewed with: patient         Joyce Agueda, PT  07/26/2018

## 2018-07-26 NOTE — PLAN OF CARE
Called report to Pancho at [facility].   Patient aware of transfer to [facility].   PICC to remain in place.   Patient to be transferred per [facility]'s transportation services

## 2018-07-26 NOTE — PLAN OF CARE
Patient has been accepted to [facility] for skilled nursing. Report to be called in to Pancho @ [phone].  scheduled for 3:30-4:00pm.         07/26/18 1444   Final Note   Assessment Type Final Discharge Note   Discharge Disposition SNF  ([facility])   Discharge plans and expectations educations in teach back method with documentation complete? Yes   Right Care Referral Info   Post Acute Recommendation SNF / Sub-Acute Rehab

## 2018-07-26 NOTE — PROGRESS NOTES
Clinical Pharmacy Progress Note: Coumadin Dosing and Monitoring     Goal INR: 2-3  Indication: History of DVT x 2   Lab Results   Component Value Date    INR 2.6 (H) 07/26/2018    INR 2.4 (H) 07/25/2018    INR 2.3 (H) 07/24/2018   Patient has been educated by pharmacist this admission.     Current dose: 5 mg daily     Plan: Pharmacy will continue current dose.   PT/INR will be monitored daily. Dose adjustments will be made accordingly.      Thank you for allowing us to participate in this patient's care.    Jayla Almanza, PharmD 7/26/2018 8:15 AM

## 2018-07-26 NOTE — DISCHARGE INSTRUCTIONS
WOUND CARE  IAD/DTI to coccyx:  1. Cleanse with bath wipes  2. Pat dry  3. Apply thin layer critic aid paste barrier BID and prn     LLQ ulcer:  1. Cleanse with saline  2. Pat dry  3. Paint with cavilon  4. Apply transparent film dressing  5. Change every 3 days       Patient discharged to [facility] for skilled nursing

## 2018-07-26 NOTE — PLAN OF CARE
Problem: Physical Therapy Goal  Goal: Physical Therapy Goal  LTGs to be met within 7 days (7/30/18):  1.  Patient will perform bed mobility with SBA  2.  Patient will participate in functional transfer assessment  3.  Patient will perform BLE therapeutic exercises 10 x 2 in all available planes  4.  Patient will demo Good static/dynamic sitting balance   Outcome: Ongoing (interventions implemented as appropriate)  PT T/F TO WC WITH MOD A

## 2018-07-26 NOTE — PT/OT/SLP PROGRESS
Occupational Therapy  Treatment    Daiana Bear   MRN: [id number]   Admitting Diagnosis: UTI (urinary tract infection)    OT Date of Treatment: 07/26/18   OT Start Time: 1045  OT Stop Time: 1115  OT Total Time (min): 30 min    Billable Minutes:  Self Care/Home Management 10 minutes and Therapeutic Activity 20 minutes    General Precautions: Standard, fall  Orthopedic Precautions: N/A  Braces: N/A         Subjective:  Communicated with CNDILCIA Anand and epic chart review prior to session.    Pain/Comfort  Pain Rating 1: 0/10    Objective:        Functional Mobility:  Bed Mobility: cga with bed mobility       Transfers:    mod a x 2 with bed> w/c  Mod a with bsc t/f's  Functional Ambulation:     Activities of Daily Living:     Feeding adaptive equipment: na     UE adaptive equipment: min a        LE adaptive equipment: na  Grooming/ hygiene sba with hair grooming                    Bathing adaptive equipment: na    Balance:   Static Sit: GOOD: Takes MODERATE challenges from all directions  Dynamic Sit: FAIR+: Maintains balance through MINIMAL excursions of active trunk motion  Static Stand: POOR: Needs MODERATE assist to maintain  Dynamic stand: POOR: Needs MOD (moderate) assist during gait    Therapeutic Activities and Exercises:  Pt seen in room and req cga with hob elevated . Pt req mod a x 2 with bed> bed side chair. Pt performed w/c mobility x 2 45 feet with min a due w/c over size and arm rest off. Pt performed hair grooming sitting in w/c with sba.    AM-PAC 6 CLICK ADL   How much help from another person does this patient currently need?   1 = Unable, Total/Dependent Assistance  2 = A lot, Maximum/Moderate Assistance  3 = A little, Minimum/Contact Guard/Supervision  4 = None, Modified [place]/Independent    Putting on and taking off regular lower body clothing? : 2  Bathing (including washing, rinsing, drying)?: 2  Toileting, which includes using toilet, bedpan, or urinal? : 2  Putting on and taking off  "regular upper body clothing?: 2  Taking care of personal grooming such as brushing teeth?: 3  Eating meals?: 3  Daily Activity Total Score: 14     AM-PAC Raw Score CMS "G-Code Modifier Level of Impairment Assistance   6 % Total / Unable   7 - 8 CM 80 - 100% Maximal Assist   9-13 CL 60 - 80% Moderate Assist   14 - 19 CK 40 - 60% Moderate Assist   20 - 22 CJ 20 - 40% Minimal Assist   23 CI 1-20% SBA / CGA   24 CH 0% Independent/ Mod I       Patient left sitting in w/c with all lines intact, call button in reach and nurse notified    ASSESSMENT:  Daiana Bear is a 62 y.o. female with a medical diagnosis of UTI (urinary tract infection) and presents with debility and generalized weaknss. Pt will continue to benefit from skilled O.T..    Rehab identified problem list/impairments: Rehab identified problem list/impairments: weakness, impaired functional mobilty, decreased safety awareness, impaired endurance, impaired balance, impaired self care skills    Rehab potential is good.    Activity tolerance: Good    Discharge recommendations: Discharge Facility/Level Of Care Needs: nursing facility, skilled     Barriers to discharge: Barriers to Discharge: None    Equipment recommendations: none     GOALS:    Occupational Therapy Goals        Problem: Occupational Therapy Goal    Goal Priority Disciplines Outcome Interventions   Occupational Therapy Goal     OT, PT/OT Ongoing (interventions implemented as appropriate)    Description:  Goals to be met by: 7-30-18    Patient will increase functional independence with ADLs by performing:    UE Dressing with Stand-by Assistance.  LE Dressing with Stand-by Assistance.  Upper extremity exercise program x 15 reps min resistance                      Plan:  Patient to be seen 3 x/week to address the above listed problems via self-care/home management, therapeutic activities, therapeutic exercises  Plan of Care expires: 07/30/18  Plan of Care reviewed with: patient     "     Gayathri Sibley, OT  07/26/2018

## 2018-07-26 NOTE — PLAN OF CARE
Nursing Home Placement:    [facility] - Waiting on Facility status in [facility]. Followed up with intake department and was told Key Vasquez is the nurse who will come by this morning to assess for acceptance.     [facility] - Waiting on Facility status in [facility]. Spoke with Jacqui Go ([phone]) and she informed me they do not have any female beds available.     [facility] - Declined; no female beds available.     [facility] - Patient choice form obtained per MELI Gallagher and referral submitted to [facility]. Patient assessed and accepted. Will proceed with insurance authorization.     @2:20pm- Insurance authorization obtained per [facility]. Call report to Pancho at [phone]. Estimated  time 3:30-4:00pm.

## 2018-07-26 NOTE — PLAN OF CARE
Problem: Patient Care Overview  Goal: Plan of Care Review  Outcome: Ongoing (interventions implemented as appropriate)  Remains free from injury. Denies pain. Sizewise bed in use turning q15min for pressure ulcer prevention and healing. Wound care performed as ordered. POCT performed with coverage given. Vital signs stable. Chart reviewed. Will continue to monitor.

## 2018-07-26 NOTE — DISCHARGE SUMMARY
"[facility] Hospital Medicine  Discharge Summary      Patient Name: Daiana Bear  MRN: [id number]  Admission Date: 7/22/2018  Hospital Length of Stay: 3 days  Discharge Date and Time:  07/26/2018 3:38 PM  Attending Physician: Victoriano Porras MD   Discharging Provider: Suzanne Benson NP  Primary Care Provider: Suzanne Henson DO      HPI:   History obtained from patient, who is not the best historian.  Ms. Bear is a 61yo female  with a PMHx of CAD with remote PCI of unknown vessel, uncontrolled DM II, HTN, HLD, PVD with remote left AKA, OA, h/o gastric bypass, and h/o provoked DVT x 2.  She presented to the ED with c/o generalized weakness and fatigue that has progressively worsened over the past few weeks.  Associated "blister" to right lower ABD fold, chronic back pain, and chronic right knee pain.  No aggravating or alleviating factors.  Denies any CP, palpitations, SOB, STAFFORD, orthopnea, PND, cough, edema, weight gain, ABD pain or distention, N/V/D, constipation, dysuria, urinary frequency or urgency, hematuria, flank pain, lightheadedness/dizziness, syncope, HA, AMS, focal deficits, fever, chills, recent falls or injury.  Patient reports she is unable to care for herself, therefore, her brother is living with her as a caregiver.  Unfortunately, patient states her brother is unable to properly care for her or prepare meals.  She states she has not eaten anything in 2 days, and does not remember the last time she was bathed.  She admits to being noncompliant with multiple medications, including metoprolol, enalapril, amlodipine, HCTZ, Lasix, Synthroid, and insulin.  She states she never misses her Coumadin.  Work-up in ED resulted INR 2, glucose 329, UA consistent with UTI.  CXR showed nonspecific cardiomediastinal silhouette enlargement, decreased lung volumes, no acute infiltrate.  Right knee XR showed no acute findings with moderate to severe primary osteoarthritis.  Troponin was ordered " in ED for unknown reason, patient denies any angina.  Troponin resulted 0.048, , EKG unrevealing.  Hospital Medicine was called to admit for elevated troponin and panniculitis.  Currently, patient appears comfortable in NAD.  Denies any CP or SOB.    * No surgery found *      Hospital Course:   The pt is a 63 yo female who presented to ED with generalized weakness and fatigue and placed in observation for UTI, Cellulitis to lower abdomen and right LE, and elevated troponin.     As for elevated troponin, Troponin 0.048>0.057>0.042. EKG reviewed, no acute ischemic changes appreciated. Cardiac echo showed mildly depressed left ventricular systolic function (EF 50-55%), diastolic dysfunction, Mild mitral regurgitation. Care discussed with Cardiology who recommended Nuclear stress test which showed WMAs and a fixed defect, no reversible ischemia. The pt was continued on ASA, stain, lopressor, and enalapril. Patient will f/u with Dr. Velázquez ( Cardiology).     Cellulitis resolved with IV vancomycin. The pt will be continued on Doxycycline. Wound care applied dressing to small abdominal wound to pannus. Miconazole applied to cutaneous candidiasis.   Blood cultures show NGTD.   Pt placed on IV Rocephin. Urine culture grew > 100,000 cfu/ml E coli sensitive to Doxycycline. Pt will be discharged on oral Doxycycline.   Pt reported she could not return home. She is unable to care for herself. PT/OT recommended SNF. Pt will be discharged to [facility].      Consults:   Consults         Status Ordering Provider     Inpatient consult to Cardiology  Once     Provider:  Chidi Edgar MD    Completed ALBARO COYNE     Inpatient consult to PICC team ([facility])  Once     Provider:  (Not yet assigned)    JENELLE James     Inpatient consult to Social Work  Once     Provider:  (Not yet assigned)    Completed ALBARO COYNE     Inpatient consult to Social Work  Once     Provider:  (Not yet assigned)     Completed ELIAZAR CLIFTON     IP consult to case management  Once     Provider:  (Not yet assigned)    Completed ISABEL KAM     Pharmacy to dose Warfarin consult  Once     Provider:  (Not yet assigned)    Acknowledged ALBARO COYNE          No new Assessment & Plan notes have been filed under this hospital service since the last note was generated.  Service: Hospital Medicine    Final Active Diagnoses:    Diagnosis Date Noted POA    PRINCIPAL PROBLEM:  UTI (urinary tract infection) [N39.0] 07/22/2018 Yes    Acute panniculitis [M79.3] 07/25/2018 No    Cellulitis of right lower extremity without abscess [L03.115] 07/23/2018 Yes    Noncompliance with medication regimen [Z91.14] 07/23/2018 Not Applicable     Chronic    Incontinence associated dermatitis [L30.8, R32] 07/23/2018 Yes    Essential hypertension [I10] 07/22/2018 Unknown     Chronic    Uncontrolled type 2 diabetes with peripheral autonomic neuropathy [E11.43, E11.65] 07/22/2018 Yes     Chronic    Elevated troponin with CAD h/o PCI in 2006 [R74.8] 07/22/2018 Yes    Debility [R53.81] 07/22/2018 Yes    History of DVT on Coumadin therapy [Z86.718] 07/22/2018 Not Applicable     Chronic      Problems Resolved During this Admission:    Diagnosis Date Noted Date Resolved POA       Discharged Condition: stable    Disposition: Skilled Nursing Facility    Follow Up:  Follow-up Information     Eliazar Henson DO In 3 days.    Specialty:  Internal Medicine  Contact information:  [address]  [place] [id number] [phone]             [facility] In 1 week.    Specialty:  Cardiology  Contact information:  [id number] [facility] [address] [phone]           [facility].    Specialty:  Rehabilitation  Contact information:  [address] [place] [address] [phone]                 Patient Instructions:     Diet Adult Regular   Order Specific Question Answer Comments   Additional  restrictions: Cardiac (Low Na/Chol)      Activity as tolerated         Significant Diagnostic Studies:   Imaging Results          X-Ray Chest 1 View for PICC_Central line (Final result)  Result time 07/22/18 20:32:12    Final result by Ward Brady MD (07/22/18 20:32:12)                 Impression:      Left PICC line in good position as above.      Electronically signed by: Ward Brady MD  Date:    07/22/2018  Time:    20:32             Narrative:    EXAMINATION:  XR CHEST 1 VIEW    CLINICAL HISTORY:  PICC line placement., Evaluate PICC line placement;    COMPARISON:  07/22/2018    FINDINGS:  Left PICC line has been placed and is in good position with the catheter tip projecting at the superior SVC level.  Otherwise no change.                               X-Ray Chest AP Portable (Final result)  Result time 07/22/18 18:14:05    Final result by Ward Brady MD (07/22/18 18:14:05)                 Impression:      Nonspecific cardiomediastinal silhouette enlargement.  Decreased lung volumes.  No acute infiltrate.      Electronically signed by: Ward Brady MD  Date:    07/22/2018  Time:    18:14             Narrative:    EXAMINATION:  XR CHEST AP PORTABLE    CLINICAL HISTORY:  Sepsis.  Respiratory distress., Sepsis;    COMPARISON:  None    FINDINGS:  Large patient with shallow inspiratory effort.  The cardiac silhouette is enlarged.  There is nonspecific widening of the cardiomediastinal silhouette more so on the right than left.  This may be secondary to epidural lipomatosis.    The lung volumes are decreased with elevation of the right diaphragm.                                Pending Diagnostic Studies:     Procedure Component Value Units Date/Time    VANCOMYCIN, TROUGH before 3rd dose [[id number]] Collected:  07/24/18 2030    Order Status:  Sent Lab Status:  No result     Specimen:  Blood from Blood          Medications:  Reconciled Home Medications:      Medication List      START taking  these medications    aspirin 81 MG EC tablet  Commonly known as:  ECOTRIN  Take 1 tablet (81 mg total) by mouth once daily.  Start taking on:  7/27/2018     atorvastatin 40 MG tablet  Commonly known as:  LIPITOR  Take 1 tablet (40 mg total) by mouth every evening.     docusate sodium 100 MG capsule  Commonly known as:  COLACE  Take 1 capsule (100 mg total) by mouth 2 (two) times daily.     doxycycline 100 MG tablet  Commonly known as:  VIBRA-TABS  Take 1 tablet (100 mg total) by mouth every 12 (twelve) hours. for 7 days     HYDROcodone-acetaminophen 5-325 mg per tablet  Commonly known as:  NORCO  Take 1 tablet by mouth every 8 (eight) hours as needed.  Replaces:  HYDROcodone-acetaminophen  mg per tablet     miconazole NITRATE 2 % 2 % top powder  Commonly known as:  MICOTIN  Apply topically 2 (two) times daily.     polyethylene glycol 17 gram Pwpk  Commonly known as:  GLYCOLAX  Take 17 g by mouth once daily.        CHANGE how you take these medications    enalapril 2.5 MG tablet  Commonly known as:  VASOTEC  Take 1 tablet (2.5 mg total) by mouth once daily.  Start taking on:  7/27/2018  What changed:  · medication strength  · how much to take  · when to take this     furosemide 20 MG tablet  Commonly known as:  LASIX  Take 1 tablet (20 mg total) by mouth once daily.  Start taking on:  7/27/2018  What changed:  · medication strength  · how much to take        CONTINUE taking these medications    amLODIPine 5 MG tablet  Commonly known as:  NORVASC  Take 5 mg by mouth.     clotrimazole 10 mg sruthi  Commonly known as:  MYCELEX  Take 10 mg by mouth.     gabapentin 600 MG tablet  Commonly known as:  NEURONTIN  Take 600 mg by mouth 2 (two) times daily.     insulin glargine 100 unit/mL (3 mL) Inpn pen  Commonly known as:  LANTUS SOLOSTAR  INJECT 58 UNITS INTO SKIN EVERY EVENING     levothyroxine 75 MCG tablet  Commonly known as:  SYNTHROID  Take 75 mcg by mouth before breakfast.     metoprolol tartrate 25 MG  tablet  Commonly known as:  LOPRESSOR  Take 25 mg by mouth 2 (two) times daily.     oxybutynin 10 MG 24 hr tablet  Commonly known as:  DITROPAN-XL  Take 10 mg by mouth.     solifenacin 10 MG tablet  Commonly known as:  VESICARE  Take 10 mg by mouth once daily.     TAMSULOSIN ORAL  Take 0.4 mg by mouth once daily.     * warfarin 4 MG tablet  Commonly known as:  COUMADIN  Take 4 mg by mouth every Mon, Wed, Fri, Sun.     * warfarin 4 MG tablet  Commonly known as:  COUMADIN  Take 4 mg by mouth every Saturday.     * warfarin 5 MG tablet  Commonly known as:  COUMADIN  Take 5 mg by mouth every Tues, Thurs.        * This list has 3 medication(s) that are the same as other medications prescribed for you. Read the directions carefully, and ask your doctor or other care provider to review them with you.            STOP taking these medications    hydroCHLOROthiazide 25 MG tablet  Commonly known as:  HYDRODIURIL     HYDROcodone-acetaminophen  mg per tablet  Commonly known as:  NORCO  Replaced by:  HYDROcodone-acetaminophen 5-325 mg per tablet     metFORMIN 1000 MG tablet  Commonly known as:  GLUCOPHAGE     metoprolol succinate 50 MG 24 hr tablet  Commonly known as:  TOPROL-XL     nystatin-triamcinolone cream  Commonly known as:  MYCOLOG II     tobramycin sulfate 0.3% 0.3 % ophthalmic solution  Commonly known as:  TOBREX            Indwelling Lines/Drains at time of discharge:   Lines/Drains/Airways     Peripherally Inserted Central Catheter Line                 PICC Double Lumen 07/22/18 1950 left basilic 3 days          Pressure Ulcer                 Pressure Injury 07/22/18 2045   Buttocks Stage 1 3 days         Pressure Injury 07/22/18 2045 Coccyx Deep tissue injury 3 days                Time spent on the discharge of patient: 42 minutes  Patient was seen and examined on the date of discharge and determined to be suitable for discharge.         Suzanne Benson NP  Department of Hospital Medicine  [facility]

## 2018-07-28 LAB
BACTERIA BLD CULT: NORMAL
BACTERIA BLD CULT: NORMAL